# Patient Record
Sex: FEMALE | Race: BLACK OR AFRICAN AMERICAN | NOT HISPANIC OR LATINO | Employment: OTHER | URBAN - METROPOLITAN AREA
[De-identification: names, ages, dates, MRNs, and addresses within clinical notes are randomized per-mention and may not be internally consistent; named-entity substitution may affect disease eponyms.]

---

## 2017-07-04 ENCOUNTER — HOSPITAL ENCOUNTER (OUTPATIENT)
Dept: RADIOLOGY | Facility: HOSPITAL | Age: 42
Discharge: HOME | End: 2017-07-04
Attending: PEDIATRICS
Payer: MEDICAID

## 2017-07-04 DIAGNOSIS — M25.561 RIGHT KNEE PAIN: ICD-10-CM

## 2017-07-04 DIAGNOSIS — M25.561 RIGHT KNEE PAIN: Primary | ICD-10-CM

## 2017-07-04 PROCEDURE — 73560 X-RAY EXAM OF KNEE 1 OR 2: CPT | Mod: 26,RT,, | Performed by: RADIOLOGY

## 2017-07-04 PROCEDURE — 73560 X-RAY EXAM OF KNEE 1 OR 2: CPT | Mod: TC,RT

## 2017-08-14 PROBLEM — M17.0 BILATERAL PRIMARY OSTEOARTHRITIS OF KNEE: Chronic | Status: ACTIVE | Noted: 2017-08-14

## 2017-09-25 ENCOUNTER — HOSPITAL ENCOUNTER (EMERGENCY)
Facility: HOSPITAL | Age: 42
Discharge: HOME | End: 2017-09-25
Attending: SURGERY
Payer: MEDICAID

## 2017-09-25 VITALS
DIASTOLIC BLOOD PRESSURE: 79 MMHG | BODY MASS INDEX: 56.82 KG/M2 | TEMPERATURE: 96 F | HEART RATE: 86 BPM | RESPIRATION RATE: 20 BRPM | WEIGHT: 293 LBS | SYSTOLIC BLOOD PRESSURE: 140 MMHG

## 2017-09-25 DIAGNOSIS — J03.90 TONSILLITIS: Primary | ICD-10-CM

## 2017-09-25 LAB — DEPRECATED S PYO AG THROAT QL EIA: NEGATIVE

## 2017-09-25 PROCEDURE — 99283 EMERGENCY DEPT VISIT LOW MDM: CPT | Mod: 25

## 2017-09-25 PROCEDURE — 87147 CULTURE TYPE IMMUNOLOGIC: CPT

## 2017-09-25 PROCEDURE — 63600175 PHARM REV CODE 636 W HCPCS: Performed by: SURGERY

## 2017-09-25 PROCEDURE — 96372 THER/PROPH/DIAG INJ SC/IM: CPT

## 2017-09-25 PROCEDURE — 87880 STREP A ASSAY W/OPTIC: CPT

## 2017-09-25 PROCEDURE — 25000003 PHARM REV CODE 250: Performed by: SURGERY

## 2017-09-25 PROCEDURE — 87081 CULTURE SCREEN ONLY: CPT

## 2017-09-25 RX ORDER — ACETAMINOPHEN 500 MG
1000 TABLET ORAL
Status: COMPLETED | OUTPATIENT
Start: 2017-09-25 | End: 2017-09-25

## 2017-09-25 RX ORDER — AMOXICILLIN AND CLAVULANATE POTASSIUM 875; 125 MG/1; MG/1
1 TABLET, FILM COATED ORAL 2 TIMES DAILY
Qty: 20 TABLET | Refills: 0 | Status: SHIPPED | OUTPATIENT
Start: 2017-09-25 | End: 2017-10-05

## 2017-09-25 RX ORDER — CEFTRIAXONE 1 G/1
1 INJECTION, POWDER, FOR SOLUTION INTRAMUSCULAR; INTRAVENOUS
Status: COMPLETED | OUTPATIENT
Start: 2017-09-25 | End: 2017-09-25

## 2017-09-25 RX ORDER — METHYLPREDNISOLONE SOD SUCC 125 MG
125 VIAL (EA) INJECTION
Status: COMPLETED | OUTPATIENT
Start: 2017-09-25 | End: 2017-09-25

## 2017-09-25 RX ORDER — IBUPROFEN 800 MG/1
800 TABLET ORAL
Status: COMPLETED | OUTPATIENT
Start: 2017-09-25 | End: 2017-09-25

## 2017-09-25 RX ADMIN — METHYLPREDNISOLONE SODIUM SUCCINATE 125 MG: 125 INJECTION, POWDER, FOR SOLUTION INTRAMUSCULAR; INTRAVENOUS at 09:09

## 2017-09-25 RX ADMIN — ACETAMINOPHEN 1000 MG: 500 TABLET ORAL at 09:09

## 2017-09-25 RX ADMIN — CEFTRIAXONE SODIUM 1 G: 1 INJECTION, POWDER, FOR SOLUTION INTRAMUSCULAR; INTRAVENOUS at 09:09

## 2017-09-25 RX ADMIN — IBUPROFEN 800 MG: 800 TABLET ORAL at 09:09

## 2017-09-25 NOTE — ED PROVIDER NOTES
[facility]                                     September 25, 2017                   Chief Complaint  42 y.o. female with Fever and Sore Throat    History of Present Illness  Marci Hollingsworth presents to the emergency room with a sore throat this weekend  Patient states she's had a low-grade temperature and a sore throat, pain with swallowing  Patient on exam has oropharyngitis and tonsillitis with exudate, no peritonsillar abscess  Patient has normal swallowing and normal phonation, no stridor or drool on evaluation now  Pt has no evidence of Ángel's angina, temperatures 96.1°F on ER triage this morning  Patient has no nasal congestion or cough, no earache; only complaint is sore throat today    The history is provided by the patient  Medical history: DM, HTN, thyroid disease  History reviewed. No pertinent surgical history.   ALLERGIES: Morphine    Review of Systems and Physical Exam     Review of Systems  -- Constitution - fever, denies fatigue, no weakness, no chills  -- Eyes - no tearing or redness, no visual disturbance  -- Ear, Nose - no tinnitus or earache, no nasal congestion or discharge  -- Mouth,Throat - sore throat, no toothache, normal voice, normal swallowing  -- Respiratory - denies cough and congestion, no shortness of breath, no ALEJO  -- Cardiovascular - denies chest pain, no palpitations, denies claudication  -- Gastrointestinal - denies abdominal pain, nausea, vomiting, or diarrhea  -- Genitourinary - no dysuria, no denies flank pain, no hematuria or frequency   -- Musculoskeletal - denies back pain, negative for myalgias and arthralgias   -- Neurological - no headache, denies weakness or seizure; no LOC  -- Skin - denies pallor, rash, or changes in skin. no hives or welts noted    Vital Signs  -- Her oral temperature is 96.1 °F (35.6 °C).   -- Her blood pressure is 140/79 and her pulse is 86.   -- Her respiration is 20.      Physical Exam  -- Nursing note and vitals  reviewed  -- Constitutional: Appears well-developed and well-nourished  -- Head: Atraumatic. Normocephalic. No obvious abnormality  -- Eyes: Pupils are equal and reactive to light. Normal conjunctiva and lids  -- Nose: Nose normal in appearance, nares grossly normal. No discharge  -- Throat: mild posterior oropharnyx erythema with exudate & tonsillitis    -- Ears: External ears and TM normal bilaterally. Normal hearing and no drainage  -- Neck: Normal range of motion. Neck supple. No masses, trachea midline  -- Cardiac: Normal rate, regular rhythm and normal heart sounds  -- Pulmonary: Normal respiratory effort, breath sounds clear to auscultation  -- Abdominal: Soft, no tenderness. Normal bowel sounds. Normal liver edge  -- Musculoskeletal: Normal range of motion, no effusions. Joints stable   -- Neurological: No focal deficits. Showed good interaction with staff  -- Vascular: Posterior tibial, dorsalis pedis and radial pulses 2+ bilaterally      Emergency Room Course     Treatment Given  -- The strep screen was negative   -- cefTRIAXone injection 1 g (not administered)   -- methylPREDNISolone sodium succinate injection 125 mg (not administered)   -- acetaminophen tablet 1,000 mg (1,000 mg Oral Given 9/25/17 0939)   -- ibuprofen tablet 800 mg (800 mg Oral Given 9/25/17 0939)     Diagnosis  -- Tonsillitis    Disposition and Plan  -- Disposition: home  -- Condition: stable  -- Follow-up: Patient to follow up with Liliana Yang MD in 1-2 days.  -- I advised the patient that we have found no life threatening condition today  -- At this time, I believe the patient is clinically stable for discharge.   -- The patient acknowledges that close follow up with a MD is required   -- Patient agrees to comply with all instruction and direction given in the ER    This note is dictated on Dragon Natural Speaking word recognition program.  There are word recognition mistakes that are occasionally missed on review.              Aaron Barrientos MD  09/25/17 0910

## 2017-09-25 NOTE — ED NOTES
No s/s adverse reaction to medications given.  Discharge instructions/escript instructions given to patient, she voiced understanding.  Discharged to home in stable condition/ambulatory w steady gait.

## 2017-09-25 NOTE — ED TRIAGE NOTES
"Patient states, "I've been running fever and having worse than a sore throat for four or five days."  "

## 2017-09-25 NOTE — ED NOTES
"Patient discharge has been delayed due to patient requesting "capsules" for medications prescribed at discharge, Dr. Barrientos notified.  "

## 2017-09-27 LAB
BACTERIA THROAT CULT: NORMAL
BACTERIA THROAT CULT: NORMAL

## 2018-05-27 ENCOUNTER — HOSPITAL ENCOUNTER (EMERGENCY)
Facility: HOSPITAL | Age: 43
Discharge: HOME | End: 2018-05-27
Attending: SURGERY
Payer: MEDICAID

## 2018-05-27 VITALS
WEIGHT: 293 LBS | SYSTOLIC BLOOD PRESSURE: 131 MMHG | HEART RATE: 85 BPM | DIASTOLIC BLOOD PRESSURE: 75 MMHG | TEMPERATURE: 97 F | RESPIRATION RATE: 16 BRPM | BODY MASS INDEX: 55.08 KG/M2

## 2018-05-27 DIAGNOSIS — R51.9 NONINTRACTABLE HEADACHE, UNSPECIFIED CHRONICITY PATTERN, UNSPECIFIED HEADACHE TYPE: Primary | ICD-10-CM

## 2018-05-27 LAB
ALBUMIN SERPL BCP-MCNC: 3.3 G/DL
ALP SERPL-CCNC: 68 U/L
ALT SERPL W/O P-5'-P-CCNC: 10 U/L
ANION GAP SERPL CALC-SCNC: 10 MMOL/L
AST SERPL-CCNC: 9 U/L
BASOPHILS # BLD AUTO: 0.03 K/UL
BASOPHILS NFR BLD: 0.3 %
BILIRUB SERPL-MCNC: 0.2 MG/DL
BUN SERPL-MCNC: 15 MG/DL
CALCIUM SERPL-MCNC: 9.2 MG/DL
CHLORIDE SERPL-SCNC: 107 MMOL/L
CO2 SERPL-SCNC: 22 MMOL/L
CREAT SERPL-MCNC: 0.8 MG/DL
DIFFERENTIAL METHOD: ABNORMAL
EOSINOPHIL # BLD AUTO: 0.3 K/UL
EOSINOPHIL NFR BLD: 2.2 %
ERYTHROCYTE [DISTWIDTH] IN BLOOD BY AUTOMATED COUNT: 13.7 %
ERYTHROCYTE [SEDIMENTATION RATE] IN BLOOD BY WESTERGREN METHOD: 40 MM/HR
EST. GFR  (AFRICAN AMERICAN): >60 ML/MIN/1.73 M^2
EST. GFR  (NON AFRICAN AMERICAN): >60 ML/MIN/1.73 M^2
GLUCOSE SERPL-MCNC: 155 MG/DL
HCT VFR BLD AUTO: 37.3 %
HGB BLD-MCNC: 12.2 G/DL
LYMPHOCYTES # BLD AUTO: 4.3 K/UL
LYMPHOCYTES NFR BLD: 38.4 %
MCH RBC QN AUTO: 30.1 PG
MCHC RBC AUTO-ENTMCNC: 32.7 G/DL
MCV RBC AUTO: 92 FL
MONOCYTES # BLD AUTO: 0.9 K/UL
MONOCYTES NFR BLD: 7.9 %
NEUTROPHILS # BLD AUTO: 5.8 K/UL
NEUTROPHILS NFR BLD: 51.2 %
PLATELET # BLD AUTO: 399 K/UL
PMV BLD AUTO: 9.1 FL
POTASSIUM SERPL-SCNC: 3.7 MMOL/L
PROT SERPL-MCNC: 7.7 G/DL
RBC # BLD AUTO: 4.05 M/UL
SODIUM SERPL-SCNC: 139 MMOL/L
WBC # BLD AUTO: 11.31 K/UL

## 2018-05-27 PROCEDURE — 99284 EMERGENCY DEPT VISIT MOD MDM: CPT | Mod: 25

## 2018-05-27 PROCEDURE — 80053 COMPREHEN METABOLIC PANEL: CPT

## 2018-05-27 PROCEDURE — 63600175 PHARM REV CODE 636 W HCPCS: Performed by: SURGERY

## 2018-05-27 PROCEDURE — 85025 COMPLETE CBC W/AUTO DIFF WBC: CPT

## 2018-05-27 PROCEDURE — 96372 THER/PROPH/DIAG INJ SC/IM: CPT

## 2018-05-27 PROCEDURE — 36415 COLL VENOUS BLD VENIPUNCTURE: CPT

## 2018-05-27 PROCEDURE — 85651 RBC SED RATE NONAUTOMATED: CPT

## 2018-05-27 RX ORDER — KETOROLAC TROMETHAMINE 10 MG/1
10 TABLET, FILM COATED ORAL EVERY 6 HOURS PRN
Qty: 15 TABLET | Refills: 0 | Status: SHIPPED | OUTPATIENT
Start: 2018-05-27 | End: 2022-02-27

## 2018-05-27 RX ORDER — KETOROLAC TROMETHAMINE 30 MG/ML
60 INJECTION, SOLUTION INTRAMUSCULAR; INTRAVENOUS
Status: COMPLETED | OUTPATIENT
Start: 2018-05-27 | End: 2018-05-27

## 2018-05-27 RX ADMIN — KETOROLAC TROMETHAMINE 60 MG: 30 INJECTION, SOLUTION INTRAMUSCULAR at 04:05

## 2018-05-27 NOTE — ED PROVIDER NOTES
[facility]                                                 Chief Complaint  42 y.o. female with Headache    History of Present Illness  Marci Hollingsworth presents to the emergency room with headache issues today  Patient is alert and appropriate, states she has a right-sided headache this weekend  Patient has had a history of headaches, denies any head trauma or injury on ER ROS  Patient has no nausea vomiting, no photophobia, answers all questions appropriately   The patient has no fever, supple neck, no infection risk on ER interview this evening  States the headache is not positional, following a tension pattern, patient has stress    The history is provided by the patient   device was not used during this ER visit  Medical history: DM, HTN, thyroid disease  History reviewed. No pertinent surgical history.   ALLERGIES: Morphine    Review of Systems and Physical Exam      Review of Systems - provided by the patient  -- Constitution - no fever, denies fatigue, no weakness, no chills  -- Eyes - no tearing or redness, no visual disturbance  -- Ear, Nose - no tinnitus or earache, no nasal congestion or discharge  -- Mouth,Throat - no sore throat, no toothache, normal voice, normal swallowing  -- Respiratory - denies cough and congestion, no shortness of breath, no ALEJO  -- Cardiovascular - denies chest pain, no palpitations, denies claudication  -- Gastrointestinal - denies abdominal pain, nausea, vomiting, or diarrhea  -- Genitourinary - no dysuria, no denies flank pain, no hematuria or frequency   -- Musculoskeletal - denies back pain, negative for myalgias and arthralgias   -- Neurological - headache, denies weakness or seizure; no LOC  -- Skin - denies pallor, rash, or changes in skin. no hives or welts noted    BP (!) 149/89   Pulse 92   Temp 96.6 °F (35.9 °C) (Tympanic)   Resp 18    Physical Exam  -- Nursing note and vitals reviewed  -- Constitutional: Appears  well-developed and well-nourished  -- Head: Atraumatic. Normocephalic. No obvious abnormality  -- Eyes: Pupils are equal and reactive to light. Normal conjunctiva and lids  -- Nose: Nose normal in appearance, nares grossly normal. No discharge  -- Throat: Mucous membranes moist, pharynx normal, normal tonsils. No lesions   -- Ears: External ears and TM normal bilaterally. Normal hearing and no drainage  -- Neck: Normal range of motion. Neck supple. No masses, trachea midline  -- Cardiac: Normal rate, regular rhythm and normal heart sounds  -- Pulmonary: Normal respiratory effort, breath sounds clear to auscultation  -- Abdominal: Soft, no tenderness. Normal bowel sounds. Normal liver edge  -- Musculoskeletal: Normal range of motion, no effusions. Joints stable   -- Neurological: No focal deficits. Showed good interaction with staff  -- Vascular: Posterior tibial, dorsalis pedis and radial pulses 2+ bilaterally      Emergency Room Course      Labs     K 3.7      CO2 22 (L)   BUN 15   CREATININE 0.8    (H)   ALKPHOS 68   AST 9 (L)   ALT 10   BILITOT 0.2   ALBUMIN 3.3 (L)   PROT 7.7   WBC 11.31   HGB 12.2   HCT 37.3    (H)     Radiology  -- The CT of the head performed in the ER today was negative for acute pathology    Additional Work up  -- ESR was 40    Medications Given  -- IM 60 mg Toradol given today in the ER     Diagnosis  -- Nonintractable headache    Disposition and Plan  -- Disposition: home  -- Condition: stable  -- Follow-up: Patient to follow up with Liliana Yang MD in 1-2 days.  -- I advised the patient that we have found no life threatening condition today  -- At this time, I believe the patient is clinically stable for discharge.   -- The patient acknowledges that close follow up with a MD is required   -- Patient agrees to comply with all instruction and direction given in the ER    This note is dictated on Dragon Natural Speaking word recognition program.  There are word  recognition mistakes that are occasionally missed on review.            Aaron Barrientos MD  05/27/18 0162

## 2021-07-30 ENCOUNTER — HOSPITAL ENCOUNTER (OUTPATIENT)
Dept: RADIOLOGY | Facility: HOSPITAL | Age: 46
Discharge: HOME | End: 2021-07-30
Attending: NURSE PRACTITIONER
Payer: MEDICAID

## 2021-07-30 VITALS — HEIGHT: 66 IN | WEIGHT: 293 LBS | BODY MASS INDEX: 47.09 KG/M2

## 2021-07-30 DIAGNOSIS — Z12.31 BREAST CANCER SCREENING BY MAMMOGRAM: ICD-10-CM

## 2021-07-30 PROCEDURE — 77067 SCR MAMMO BI INCL CAD: CPT | Mod: TC

## 2021-07-30 PROCEDURE — 77067 MAMMO DIGITAL SCREENING BILAT WITH TOMO: ICD-10-PCS | Mod: 26,,, | Performed by: RADIOLOGY

## 2021-07-30 PROCEDURE — 77063 BREAST TOMOSYNTHESIS BI: CPT | Mod: 26,,, | Performed by: RADIOLOGY

## 2021-07-30 PROCEDURE — 77063 MAMMO DIGITAL SCREENING BILAT WITH TOMO: ICD-10-PCS | Mod: 26,,, | Performed by: RADIOLOGY

## 2021-07-30 PROCEDURE — 77067 SCR MAMMO BI INCL CAD: CPT | Mod: 26,,, | Performed by: RADIOLOGY

## 2021-12-05 ENCOUNTER — HOSPITAL ENCOUNTER (EMERGENCY)
Facility: HOSPITAL | Age: 46
Discharge: HOME | End: 2021-12-05
Attending: SURGERY
Payer: MEDICAID

## 2021-12-05 VITALS
BODY MASS INDEX: 53.75 KG/M2 | SYSTOLIC BLOOD PRESSURE: 159 MMHG | DIASTOLIC BLOOD PRESSURE: 86 MMHG | OXYGEN SATURATION: 98 % | TEMPERATURE: 98 F | RESPIRATION RATE: 20 BRPM | HEART RATE: 100 BPM | WEIGHT: 293 LBS

## 2021-12-05 DIAGNOSIS — M75.90 BURSITIS AND TENDINITIS OF SHOULDER REGION: Primary | ICD-10-CM

## 2021-12-05 DIAGNOSIS — M75.50 BURSITIS AND TENDINITIS OF SHOULDER REGION: Primary | ICD-10-CM

## 2021-12-05 DIAGNOSIS — R52 PAIN: ICD-10-CM

## 2021-12-05 DIAGNOSIS — M25.519 SHOULDER PAIN: ICD-10-CM

## 2021-12-05 PROCEDURE — 93010 ELECTROCARDIOGRAM REPORT: CPT | Mod: ,,, | Performed by: INTERNAL MEDICINE

## 2021-12-05 PROCEDURE — 93010 EKG 12-LEAD: ICD-10-PCS | Mod: ,,, | Performed by: INTERNAL MEDICINE

## 2021-12-05 PROCEDURE — 99284 EMERGENCY DEPT VISIT MOD MDM: CPT | Mod: 25

## 2021-12-05 PROCEDURE — 93005 ELECTROCARDIOGRAM TRACING: CPT

## 2021-12-05 RX ORDER — SULINDAC 150 MG/1
150 TABLET ORAL 2 TIMES DAILY
Qty: 20 TABLET | Refills: 0 | Status: SHIPPED | OUTPATIENT
Start: 2021-12-05 | End: 2021-12-15

## 2021-12-05 RX ORDER — TRAMADOL HYDROCHLORIDE 50 MG/1
50 TABLET ORAL EVERY 8 HOURS PRN
Qty: 12 TABLET | Refills: 0 | Status: SHIPPED | OUTPATIENT
Start: 2021-12-05 | End: 2021-12-09

## 2022-02-27 ENCOUNTER — HOSPITAL ENCOUNTER (EMERGENCY)
Facility: HOSPITAL | Age: 47
Discharge: HOME | End: 2022-02-27
Attending: STUDENT IN AN ORGANIZED HEALTH CARE EDUCATION/TRAINING PROGRAM
Payer: MEDICAID

## 2022-02-27 VITALS
OXYGEN SATURATION: 98 % | HEART RATE: 93 BPM | DIASTOLIC BLOOD PRESSURE: 79 MMHG | BODY MASS INDEX: 53.73 KG/M2 | SYSTOLIC BLOOD PRESSURE: 159 MMHG | WEIGHT: 293 LBS | RESPIRATION RATE: 20 BRPM | TEMPERATURE: 97 F

## 2022-02-27 DIAGNOSIS — G89.29 CHRONIC LEFT SHOULDER PAIN: Primary | ICD-10-CM

## 2022-02-27 DIAGNOSIS — M25.511 CHRONIC RIGHT SHOULDER PAIN: ICD-10-CM

## 2022-02-27 DIAGNOSIS — M25.512 CHRONIC LEFT SHOULDER PAIN: Primary | ICD-10-CM

## 2022-02-27 DIAGNOSIS — G89.29 CHRONIC RIGHT SHOULDER PAIN: ICD-10-CM

## 2022-02-27 PROCEDURE — 99284 EMERGENCY DEPT VISIT MOD MDM: CPT | Mod: 25

## 2022-02-27 PROCEDURE — 63600175 PHARM REV CODE 636 W HCPCS: Performed by: STUDENT IN AN ORGANIZED HEALTH CARE EDUCATION/TRAINING PROGRAM

## 2022-02-27 PROCEDURE — 96372 THER/PROPH/DIAG INJ SC/IM: CPT

## 2022-02-27 RX ORDER — KETOROLAC TROMETHAMINE 30 MG/ML
15 INJECTION, SOLUTION INTRAMUSCULAR; INTRAVENOUS
Status: COMPLETED | OUTPATIENT
Start: 2022-02-27 | End: 2022-02-27

## 2022-02-27 RX ORDER — CYCLOBENZAPRINE HCL 10 MG
10 TABLET ORAL 3 TIMES DAILY PRN
Qty: 15 TABLET | Refills: 0 | Status: SHIPPED | OUTPATIENT
Start: 2022-02-27 | End: 2022-03-04

## 2022-02-27 RX ORDER — IBUPROFEN 800 MG/1
800 TABLET ORAL EVERY 6 HOURS PRN
Qty: 20 TABLET | Refills: 0 | Status: SHIPPED | OUTPATIENT
Start: 2022-02-27 | End: 2022-04-06

## 2022-02-27 RX ADMIN — KETOROLAC TROMETHAMINE 15 MG: 30 INJECTION, SOLUTION INTRAMUSCULAR at 07:02
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
## 2022-04-06 ENCOUNTER — OFFICE VISIT (OUTPATIENT)
Dept: ORTHOPEDICS | Facility: CLINIC | Age: 47
End: 2022-04-06
Payer: MEDICAID

## 2022-04-06 VITALS
HEART RATE: 89 BPM | BODY MASS INDEX: 47.09 KG/M2 | SYSTOLIC BLOOD PRESSURE: 132 MMHG | DIASTOLIC BLOOD PRESSURE: 84 MMHG | WEIGHT: 293 LBS | HEIGHT: 66 IN | RESPIRATION RATE: 18 BRPM

## 2022-04-06 DIAGNOSIS — M25.512 CHRONIC LEFT SHOULDER PAIN: Primary | ICD-10-CM

## 2022-04-06 DIAGNOSIS — M25.561 PAIN IN BOTH KNEES, UNSPECIFIED CHRONICITY: Primary | ICD-10-CM

## 2022-04-06 DIAGNOSIS — G89.29 CHRONIC LEFT SHOULDER PAIN: Primary | ICD-10-CM

## 2022-04-06 DIAGNOSIS — M25.562 PAIN IN BOTH KNEES, UNSPECIFIED CHRONICITY: Primary | ICD-10-CM

## 2022-04-06 PROCEDURE — 99203 PR OFFICE/OUTPT VISIT, NEW, LEVL III, 30-44 MIN: ICD-10-PCS | Mod: S$PBB,25,, | Performed by: PHYSICIAN ASSISTANT

## 2022-04-06 PROCEDURE — 1160F RVW MEDS BY RX/DR IN RCRD: CPT | Mod: CPTII,,, | Performed by: PHYSICIAN ASSISTANT

## 2022-04-06 PROCEDURE — 3008F PR BODY MASS INDEX (BMI) DOCUMENTED: ICD-10-PCS | Mod: CPTII,,, | Performed by: PHYSICIAN ASSISTANT

## 2022-04-06 PROCEDURE — 20610 DRAIN/INJ JOINT/BURSA W/O US: CPT | Mod: PBBFAC,LT | Performed by: PHYSICIAN ASSISTANT

## 2022-04-06 PROCEDURE — 1159F MED LIST DOCD IN RCRD: CPT | Mod: CPTII,,, | Performed by: PHYSICIAN ASSISTANT

## 2022-04-06 PROCEDURE — 99999 PR PBB SHADOW E&M-EST. PATIENT-LVL IV: CPT | Mod: PBBFAC,,, | Performed by: PHYSICIAN ASSISTANT

## 2022-04-06 PROCEDURE — 4010F ACE/ARB THERAPY RXD/TAKEN: CPT | Mod: CPTII,,, | Performed by: PHYSICIAN ASSISTANT

## 2022-04-06 PROCEDURE — 3008F BODY MASS INDEX DOCD: CPT | Mod: CPTII,,, | Performed by: PHYSICIAN ASSISTANT

## 2022-04-06 PROCEDURE — 20610 DRAIN/INJ JOINT/BURSA W/O US: CPT | Mod: S$PBB,LT,, | Performed by: PHYSICIAN ASSISTANT

## 2022-04-06 PROCEDURE — 1159F PR MEDICATION LIST DOCUMENTED IN MEDICAL RECORD: ICD-10-PCS | Mod: CPTII,,, | Performed by: PHYSICIAN ASSISTANT

## 2022-04-06 PROCEDURE — 99203 OFFICE O/P NEW LOW 30 MIN: CPT | Mod: S$PBB,25,, | Performed by: PHYSICIAN ASSISTANT

## 2022-04-06 PROCEDURE — 1160F PR REVIEW ALL MEDS BY PRESCRIBER/CLIN PHARMACIST DOCUMENTED: ICD-10-PCS | Mod: CPTII,,, | Performed by: PHYSICIAN ASSISTANT

## 2022-04-06 PROCEDURE — 99999 PR PBB SHADOW E&M-EST. PATIENT-LVL IV: ICD-10-PCS | Mod: PBBFAC,,, | Performed by: PHYSICIAN ASSISTANT

## 2022-04-06 PROCEDURE — 4010F PR ACE/ARB THEARPY RXD/TAKEN: ICD-10-PCS | Mod: CPTII,,, | Performed by: PHYSICIAN ASSISTANT

## 2022-04-06 PROCEDURE — 3075F PR MOST RECENT SYSTOLIC BLOOD PRESS GE 130-139MM HG: ICD-10-PCS | Mod: CPTII,,, | Performed by: PHYSICIAN ASSISTANT

## 2022-04-06 PROCEDURE — 3075F SYST BP GE 130 - 139MM HG: CPT | Mod: CPTII,,, | Performed by: PHYSICIAN ASSISTANT

## 2022-04-06 PROCEDURE — 3079F PR MOST RECENT DIASTOLIC BLOOD PRESSURE 80-89 MM HG: ICD-10-PCS | Mod: CPTII,,, | Performed by: PHYSICIAN ASSISTANT

## 2022-04-06 PROCEDURE — 20610 PR DRAIN/INJECT LARGE JOINT/BURSA: ICD-10-PCS | Mod: S$PBB,LT,, | Performed by: PHYSICIAN ASSISTANT

## 2022-04-06 PROCEDURE — 99214 OFFICE O/P EST MOD 30 MIN: CPT | Mod: PBBFAC | Performed by: PHYSICIAN ASSISTANT

## 2022-04-06 PROCEDURE — 3079F DIAST BP 80-89 MM HG: CPT | Mod: CPTII,,, | Performed by: PHYSICIAN ASSISTANT

## 2022-04-06 RX ORDER — ROSUVASTATIN CALCIUM 5 MG/1
5 TABLET, COATED ORAL NIGHTLY
COMMUNITY
Start: 2022-03-22

## 2022-04-06 RX ORDER — LIOTHYRONINE SODIUM 25 UG/1
25 TABLET ORAL DAILY
COMMUNITY
Start: 2022-03-22 | End: 2022-05-05 | Stop reason: DRUGHIGH

## 2022-04-06 RX ORDER — MEDROXYPROGESTERONE ACETATE 150 MG/ML
INJECTION, SUSPENSION INTRAMUSCULAR
COMMUNITY

## 2022-04-06 RX ORDER — MELOXICAM 15 MG/1
15 TABLET ORAL DAILY
Qty: 30 TABLET | Refills: 0 | Status: SHIPPED | OUTPATIENT
Start: 2022-04-06 | End: 2022-04-27 | Stop reason: ALTCHOICE

## 2022-04-06 RX ORDER — DULAGLUTIDE 0.75 MG/.5ML
INJECTION, SOLUTION SUBCUTANEOUS
COMMUNITY
Start: 2022-03-30

## 2022-04-06 RX ORDER — TRIAMCINOLONE ACETONIDE 40 MG/ML
40 INJECTION, SUSPENSION INTRA-ARTICULAR; INTRAMUSCULAR ONCE
Status: COMPLETED | OUTPATIENT
Start: 2022-04-06 | End: 2022-04-06

## 2022-04-06 RX ORDER — NAPROXEN 500 MG/1
500 TABLET ORAL EVERY 12 HOURS PRN
COMMUNITY
Start: 2022-03-11 | End: 2022-04-19 | Stop reason: ALTCHOICE

## 2022-04-06 RX ADMIN — TRIAMCINOLONE ACETONIDE 40 MG: 40 INJECTION, SUSPENSION INTRA-ARTICULAR; INTRAMUSCULAR at 09:04
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
## 2022-04-08 ENCOUNTER — OFFICE VISIT (OUTPATIENT)
Dept: ORTHOPEDICS | Facility: CLINIC | Age: 47
End: 2022-04-08
Payer: MEDICAID

## 2022-04-08 ENCOUNTER — HOSPITAL ENCOUNTER (OUTPATIENT)
Dept: RADIOLOGY | Facility: HOSPITAL | Age: 47
Discharge: HOME | End: 2022-04-08
Attending: PHYSICIAN ASSISTANT
Payer: MEDICAID

## 2022-04-08 VITALS
BODY MASS INDEX: 47.09 KG/M2 | HEART RATE: 92 BPM | DIASTOLIC BLOOD PRESSURE: 88 MMHG | SYSTOLIC BLOOD PRESSURE: 124 MMHG | HEIGHT: 66 IN | RESPIRATION RATE: 18 BRPM | WEIGHT: 293 LBS

## 2022-04-08 DIAGNOSIS — M25.562 CHRONIC PAIN OF BOTH KNEES: ICD-10-CM

## 2022-04-08 DIAGNOSIS — M25.561 CHRONIC PAIN OF BOTH KNEES: ICD-10-CM

## 2022-04-08 DIAGNOSIS — M25.562 PAIN IN BOTH KNEES, UNSPECIFIED CHRONICITY: ICD-10-CM

## 2022-04-08 DIAGNOSIS — M25.561 PAIN IN BOTH KNEES, UNSPECIFIED CHRONICITY: ICD-10-CM

## 2022-04-08 DIAGNOSIS — G89.29 CHRONIC PAIN OF BOTH KNEES: ICD-10-CM

## 2022-04-08 DIAGNOSIS — M17.0 BILATERAL PRIMARY OSTEOARTHRITIS OF KNEE: Primary | ICD-10-CM

## 2022-04-08 PROCEDURE — 3008F PR BODY MASS INDEX (BMI) DOCUMENTED: ICD-10-PCS | Mod: CPTII,,, | Performed by: PHYSICIAN ASSISTANT

## 2022-04-08 PROCEDURE — 3079F PR MOST RECENT DIASTOLIC BLOOD PRESSURE 80-89 MM HG: ICD-10-PCS | Mod: CPTII,,, | Performed by: PHYSICIAN ASSISTANT

## 2022-04-08 PROCEDURE — 99213 PR OFFICE/OUTPT VISIT, EST, LEVL III, 20-29 MIN: ICD-10-PCS | Mod: S$PBB,,, | Performed by: PHYSICIAN ASSISTANT

## 2022-04-08 PROCEDURE — 73564 X-RAY EXAM KNEE 4 OR MORE: CPT | Mod: TC,50

## 2022-04-08 PROCEDURE — 1159F MED LIST DOCD IN RCRD: CPT | Mod: CPTII,,, | Performed by: PHYSICIAN ASSISTANT

## 2022-04-08 PROCEDURE — 73564 XR KNEE ORTHO BILAT WITH FLEXION: ICD-10-PCS | Mod: 26,50,, | Performed by: RADIOLOGY

## 2022-04-08 PROCEDURE — 1160F RVW MEDS BY RX/DR IN RCRD: CPT | Mod: CPTII,,, | Performed by: PHYSICIAN ASSISTANT

## 2022-04-08 PROCEDURE — 3008F BODY MASS INDEX DOCD: CPT | Mod: CPTII,,, | Performed by: PHYSICIAN ASSISTANT

## 2022-04-08 PROCEDURE — 1159F PR MEDICATION LIST DOCUMENTED IN MEDICAL RECORD: ICD-10-PCS | Mod: CPTII,,, | Performed by: PHYSICIAN ASSISTANT

## 2022-04-08 PROCEDURE — 3079F DIAST BP 80-89 MM HG: CPT | Mod: CPTII,,, | Performed by: PHYSICIAN ASSISTANT

## 2022-04-08 PROCEDURE — 99213 OFFICE O/P EST LOW 20 MIN: CPT | Mod: S$PBB,,, | Performed by: PHYSICIAN ASSISTANT

## 2022-04-08 PROCEDURE — 99999 PR PBB SHADOW E&M-EST. PATIENT-LVL IV: CPT | Mod: PBBFAC,,, | Performed by: PHYSICIAN ASSISTANT

## 2022-04-08 PROCEDURE — 1160F PR REVIEW ALL MEDS BY PRESCRIBER/CLIN PHARMACIST DOCUMENTED: ICD-10-PCS | Mod: CPTII,,, | Performed by: PHYSICIAN ASSISTANT

## 2022-04-08 PROCEDURE — 4010F PR ACE/ARB THEARPY RXD/TAKEN: ICD-10-PCS | Mod: CPTII,,, | Performed by: PHYSICIAN ASSISTANT

## 2022-04-08 PROCEDURE — 3074F PR MOST RECENT SYSTOLIC BLOOD PRESSURE < 130 MM HG: ICD-10-PCS | Mod: CPTII,,, | Performed by: PHYSICIAN ASSISTANT

## 2022-04-08 PROCEDURE — 73564 X-RAY EXAM KNEE 4 OR MORE: CPT | Mod: 26,50,, | Performed by: RADIOLOGY

## 2022-04-08 PROCEDURE — 99214 OFFICE O/P EST MOD 30 MIN: CPT | Mod: PBBFAC | Performed by: PHYSICIAN ASSISTANT

## 2022-04-08 PROCEDURE — 99999 PR PBB SHADOW E&M-EST. PATIENT-LVL IV: ICD-10-PCS | Mod: PBBFAC,,, | Performed by: PHYSICIAN ASSISTANT

## 2022-04-08 PROCEDURE — 3074F SYST BP LT 130 MM HG: CPT | Mod: CPTII,,, | Performed by: PHYSICIAN ASSISTANT

## 2022-04-08 PROCEDURE — 4010F ACE/ARB THERAPY RXD/TAKEN: CPT | Mod: CPTII,,, | Performed by: PHYSICIAN ASSISTANT

## 2022-04-08 NOTE — PROGRESS NOTES
Patient reported to clinic for office visit with BRUCE Stringer. Measured patient for Valgus  per orders.  6 in above knee: 28 in  Mid knee: 20 in  6 in below knee: 17 in    Orders emailed to Jesus Rodas for processing.

## 2022-04-08 NOTE — PROGRESS NOTES
Subjective:      Patient ID: Marci Hollingsworth is a 46 y.o. female.    Chief Complaint: Pain of the Left Knee and Pain of the Right Knee    Review of patient's allergies indicates:   Allergen Reactions    Morphine Hives    Shellfish containing products Swelling    Citrus flavor Hives    Sulindac Itching    Penicillin Rash        45 yo F presents to clinic with c/o bilateral knee pain x years.  Pain is achy and located mostly to medial knees.  Left worse than right.  Worse with bending knees and walking and improves with rest.  Also worse with prolonged sitting/standing.  She has previously seen Chad matthews and had CSI, last one in 2018.  She reports good relief from CSI initially but last one lasted about a week.  Occasional swelling, no redness or warmth.  No locking/catching/giving out.  No radiation of pain.  No numbness/tingling.          Review of Systems   Constitutional: Negative for chills, diaphoresis and fever.   HENT: Negative for congestion, ear discharge and ear pain.    Eyes: Negative for blurred vision, discharge, double vision and pain.   Cardiovascular: Negative for chest pain, claudication and cyanosis.   Respiratory: Negative for cough, hemoptysis and shortness of breath.    Endocrine: Negative for cold intolerance and heat intolerance.   Skin: Negative for color change, dry skin, itching and rash.   Musculoskeletal: Positive for arthritis, joint pain and joint swelling. Negative for back pain, falls, gout, muscle weakness and neck pain.   Gastrointestinal: Negative for abdominal pain and change in bowel habit.   Neurological: Negative for brief paralysis, disturbances in coordination, dizziness, numbness and paresthesias.   Psychiatric/Behavioral: Negative for altered mental status and depression.         Objective:          General    Constitutional: She is oriented to person, place, and time. She appears well-developed and well-nourished. No distress.   HENT:   Head: Atraumatic.    Eyes: EOM are normal. Right eye exhibits no discharge. Left eye exhibits no discharge.   Cardiovascular: Normal rate.    Pulmonary/Chest: Effort normal. No respiratory distress.   Abdominal: Soft.   Neurological: She is alert and oriented to person, place, and time.   Psychiatric: She has a normal mood and affect. Her behavior is normal.     General Musculoskeletal Exam   Gait: antalgic       Right Knee Exam     Inspection   Erythema: absent  Scars: absent  Swelling: absent  Effusion: absent  Deformity: absent  Bruising: absent    Tenderness   The patient is tender to palpation of the medial joint line.    Crepitus   The patient has crepitus of the patella and medial joint line.    Range of Motion   Extension: 0   Flexion: 130     Tests   Ligament Examination   MCL - Valgus: normal (0 to 2mm)  LCL - Varus: normal    Other   Popliteal (Baker's) Cyst: absent  Sensation: normal    Left Knee Exam     Inspection   Erythema: absent  Scars: absent  Swelling: absent  Effusion: absent  Deformity: absent  Bruising: absent    Tenderness   The patient tender to palpation of the medial joint line.    Crepitus   The patient has crepitus of the patella and medial joint line.    Range of Motion   Extension: 0   Flexion: 130     Tests   Stability   MCL - Valgus: normal (0 to 2mm)  LCL - Varus: normal (0 to 2mm)    Other   Popliteal (Baker's) Cyst: present  Sensation: normal    Muscle Strength   Right Lower Extremity   Hip Abduction: 5/5   Quadriceps:  4/5   Hamstrin/5   Left Lower Extremity   Hip Abduction: 5/5   Quadriceps:  4/5   Hamstrin/5     Vascular Exam     Right Pulses  Dorsalis Pedis:      2+          Left Pulses  Dorsalis Pedis:      2+          Edema  Right Lower Leg: absent  Left Lower Leg: absent              Assessment:         Xray Bilateral Knee 22:  Right: No fracture or dislocation.  Small joint effusion.  Severe medial compartment joint space narrowing with bone-on-bone.  There is subchondral  sclerosis with marginal osteophyte formation.  Significant dorsal spurring from the patella with severe osteoarthritic change of the patellofemoral compartment.     Left: No fracture or dislocation.  Small joint effusion.  Severe medial compartment joint space narrowing with bone on bone and subchondral sclerosis.  Marginal osteophytes are present.  Significant degenerative changes of the patellofemoral compartment with large osseous spurring dorsally.      Encounter Diagnoses   Name Primary?    Bilateral primary osteoarthritis of knee Yes    Chronic pain of both knees     Bilateral primary osteoarthritis of knee  -     sodium hyaluronate (EUFLEXXA) 10 mg/mL(mw 2.4 -3.6 million) injection 20 mg  -     sodium hyaluronate (EUFLEXXA) 10 mg/mL(mw 2.4 -3.6 million) injection 20 mg  -     Prior authorization Order  -     KNEE BRACE FOR HOME USE    Chronic pain of both knees               Plan:         The total face-to-face encounter time with this patient was 30 min and greater than 50% of of the encounter time was spent counseling the patient, coordinating care, and education regarding the pathology and natural history of her diagnosis. We have discussed a variety of treatment options including medications, injections, physical therapy and other alternative treatments. I also explained the indications, risks and benefits of surgery. We also discussed weight loss and ideal BMI for surgery. Pt would like to proceed with visco-supplementation at this time. She states that she is trying to lose weight and would like to consider joint replacement in the future. She would also like to try  brace for left knee.    I made the decision to obtain old records of the patient including previous notes and imaging. New imaging was ordered today of the extremity or extremities evaluated.     1. Pre-authorization placed for Euflexxa series injections.  2. Ice compress to the affected area 2-3x a day for 15-20 minutes as needed  for pain management.  3. Mobic 15 mg 1 time daily PRN for pain management. Patient understands to take with food and/or OTC prilosec to decrease GI side effects.  4. Valgus  brace for left knee. Pt measured today in clinic.  5. Knee conditioning HEP.  6. RTC in 2 weeks for Euflexxa series injections #1 of 3.    Patient voices understanding of and agreement with treatment plan. All of the patient's questions were answered and the patient will contact us if she has any questions or concerns in the interim.

## 2022-04-13 ENCOUNTER — HOSPITAL ENCOUNTER (OUTPATIENT)
Dept: RADIOLOGY | Facility: HOSPITAL | Age: 47
Discharge: HOME | End: 2022-04-13
Attending: NURSE PRACTITIONER
Payer: MEDICAID

## 2022-04-13 DIAGNOSIS — E03.9 HYPOTHYROIDISM, ADULT: ICD-10-CM

## 2022-04-13 PROCEDURE — 76536 US EXAM OF HEAD AND NECK: CPT | Mod: 26,,, | Performed by: RADIOLOGY

## 2022-04-13 PROCEDURE — 76536 US SOFT TISSUE HEAD NECK THYROID: ICD-10-PCS | Mod: 26,,, | Performed by: RADIOLOGY

## 2022-04-13 PROCEDURE — 76536 US EXAM OF HEAD AND NECK: CPT | Mod: TC

## 2022-04-18 ENCOUNTER — TELEPHONE (OUTPATIENT)
Dept: ORTHOPEDICS | Facility: CLINIC | Age: 47
End: 2022-04-18
Payer: MEDICAID

## 2022-04-18 DIAGNOSIS — G89.29 CHRONIC LEFT SHOULDER PAIN: Primary | ICD-10-CM

## 2022-04-18 DIAGNOSIS — M25.512 CHRONIC LEFT SHOULDER PAIN: Primary | ICD-10-CM

## 2022-04-18 DIAGNOSIS — M17.0 BILATERAL PRIMARY OSTEOARTHRITIS OF KNEE: ICD-10-CM

## 2022-04-18 NOTE — TELEPHONE ENCOUNTER
Spoke with patient. States her left shoulder pain did not get better after injection on 4/8/22. States she has been taking Tylenol 500 mg every 6 hours and using biofreeze. States she has not had any relief. States current pain is 9/10. Aware BRUCE Stringer are out of the office until tomorrow. Advised patient is pain is intolerable today to report to ER due to provider being out of the office. Voiced understanding.

## 2022-04-18 NOTE — TELEPHONE ENCOUNTER
----- Message from Valerie Bone MA sent at 2022 10:03 AM CDT -----  Contact: SELF  Marci Hollingsworth  MRN: [id number]  : 1975  PCP: Sofia Medina  Home Phone      [phone]  Work Phone      [phone]  Mobile          [phone]      MESSAGE:  Needs to speak to nurse regarding shoulder pain.      PHONE:  [phone]

## 2022-04-19 RX ORDER — KETOROLAC TROMETHAMINE 10 MG/1
10 TABLET, FILM COATED ORAL EVERY 6 HOURS PRN
Qty: 20 TABLET | Refills: 0 | Status: SHIPPED | OUTPATIENT
Start: 2022-04-19 | End: 2022-04-24

## 2022-04-19 NOTE — TELEPHONE ENCOUNTER
Spoke with patient. Notified. Voiced understanding. States she is doing therapy at home, due to unable to got to outpatient PT. States that Mobic that was prescribed is not helping. Requesting alternative. Please advise.

## 2022-04-19 NOTE — TELEPHONE ENCOUNTER
Toradol was sent to pharmacy. Stop Mobic and do not take any other NSAIDs. She should follow up in clinic if pain continues.

## 2022-04-22 ENCOUNTER — TELEPHONE (OUTPATIENT)
Dept: ORTHOPEDICS | Facility: CLINIC | Age: 47
End: 2022-04-22
Payer: MEDICAID

## 2022-04-22 NOTE — TELEPHONE ENCOUNTER
Spoke with patient. Advised that bilateral Euflexxa was approved by insurance. States she cannot come today. Rescheduled.

## 2022-04-22 NOTE — TELEPHONE ENCOUNTER
----- Message from Eva Silver sent at 2022  8:56 AM CDT -----  Regarding: Request to speak to a nurse  Contact: self  Marci Hollingsworth  MRN: [id number]  : 1975  PCP: Sofia Medina  Home Phone      [phone]  Work Phone      [phone]  Mobile          [phone]      MESSAGE:   Request to speak to a nurse regarding status of insurance approval for infection to both knees.   Patient c/o pain to both knees, the left knee hurts the most.   Appointment with BRUCE Stringer - 22    Phone # [phone]    Pharmacy -  [facility], [place] [id number] [place] [address] [address]

## 2022-04-27 ENCOUNTER — OFFICE VISIT (OUTPATIENT)
Dept: ORTHOPEDICS | Facility: CLINIC | Age: 47
End: 2022-04-27
Payer: MEDICAID

## 2022-04-27 VITALS
SYSTOLIC BLOOD PRESSURE: 104 MMHG | HEART RATE: 96 BPM | BODY MASS INDEX: 47.09 KG/M2 | DIASTOLIC BLOOD PRESSURE: 72 MMHG | HEIGHT: 66 IN | WEIGHT: 293 LBS | RESPIRATION RATE: 18 BRPM

## 2022-04-27 DIAGNOSIS — M17.12 PRIMARY OSTEOARTHRITIS OF LEFT KNEE: Primary | ICD-10-CM

## 2022-04-27 PROCEDURE — 99499 UNLISTED E&M SERVICE: CPT | Mod: S$PBB,,, | Performed by: PHYSICIAN ASSISTANT

## 2022-04-27 PROCEDURE — 20610 PR DRAIN/INJECT LARGE JOINT/BURSA: ICD-10-PCS | Mod: S$PBB,LT,, | Performed by: PHYSICIAN ASSISTANT

## 2022-04-27 PROCEDURE — 3074F SYST BP LT 130 MM HG: CPT | Mod: CPTII,,, | Performed by: PHYSICIAN ASSISTANT

## 2022-04-27 PROCEDURE — 4010F ACE/ARB THERAPY RXD/TAKEN: CPT | Mod: CPTII,,, | Performed by: PHYSICIAN ASSISTANT

## 2022-04-27 PROCEDURE — 99999 PR PBB SHADOW E&M-EST. PATIENT-LVL III: ICD-10-PCS | Mod: PBBFAC,,, | Performed by: PHYSICIAN ASSISTANT

## 2022-04-27 PROCEDURE — 3078F DIAST BP <80 MM HG: CPT | Mod: CPTII,,, | Performed by: PHYSICIAN ASSISTANT

## 2022-04-27 PROCEDURE — 1159F MED LIST DOCD IN RCRD: CPT | Mod: CPTII,,, | Performed by: PHYSICIAN ASSISTANT

## 2022-04-27 PROCEDURE — 20610 DRAIN/INJ JOINT/BURSA W/O US: CPT | Mod: S$PBB,LT,, | Performed by: PHYSICIAN ASSISTANT

## 2022-04-27 PROCEDURE — 3074F PR MOST RECENT SYSTOLIC BLOOD PRESSURE < 130 MM HG: ICD-10-PCS | Mod: CPTII,,, | Performed by: PHYSICIAN ASSISTANT

## 2022-04-27 PROCEDURE — 20610 DRAIN/INJ JOINT/BURSA W/O US: CPT | Mod: PBBFAC,LT | Performed by: PHYSICIAN ASSISTANT

## 2022-04-27 PROCEDURE — 99213 OFFICE O/P EST LOW 20 MIN: CPT | Mod: PBBFAC,25 | Performed by: PHYSICIAN ASSISTANT

## 2022-04-27 PROCEDURE — 3008F BODY MASS INDEX DOCD: CPT | Mod: CPTII,,, | Performed by: PHYSICIAN ASSISTANT

## 2022-04-27 PROCEDURE — 3008F PR BODY MASS INDEX (BMI) DOCUMENTED: ICD-10-PCS | Mod: CPTII,,, | Performed by: PHYSICIAN ASSISTANT

## 2022-04-27 PROCEDURE — 99499 NO LOS: ICD-10-PCS | Mod: S$PBB,,, | Performed by: PHYSICIAN ASSISTANT

## 2022-04-27 PROCEDURE — 1160F PR REVIEW ALL MEDS BY PRESCRIBER/CLIN PHARMACIST DOCUMENTED: ICD-10-PCS | Mod: CPTII,,, | Performed by: PHYSICIAN ASSISTANT

## 2022-04-27 PROCEDURE — 1160F RVW MEDS BY RX/DR IN RCRD: CPT | Mod: CPTII,,, | Performed by: PHYSICIAN ASSISTANT

## 2022-04-27 PROCEDURE — 1159F PR MEDICATION LIST DOCUMENTED IN MEDICAL RECORD: ICD-10-PCS | Mod: CPTII,,, | Performed by: PHYSICIAN ASSISTANT

## 2022-04-27 PROCEDURE — 99999 PR PBB SHADOW E&M-EST. PATIENT-LVL III: CPT | Mod: PBBFAC,,, | Performed by: PHYSICIAN ASSISTANT

## 2022-04-27 PROCEDURE — 3078F PR MOST RECENT DIASTOLIC BLOOD PRESSURE < 80 MM HG: ICD-10-PCS | Mod: CPTII,,, | Performed by: PHYSICIAN ASSISTANT

## 2022-04-27 PROCEDURE — 4010F PR ACE/ARB THEARPY RXD/TAKEN: ICD-10-PCS | Mod: CPTII,,, | Performed by: PHYSICIAN ASSISTANT

## 2022-04-27 RX ORDER — IBUPROFEN 800 MG/1
800 TABLET ORAL 3 TIMES DAILY
Qty: 60 TABLET | Refills: 0 | Status: SHIPPED | OUTPATIENT
Start: 2022-04-27

## 2022-04-27 RX ADMIN — Medication 20 MG: at 01:04

## 2022-04-27 NOTE — PROGRESS NOTES
Marci Hollingsworth is here for follow up of left knee arthritis. Pt is requesting Euflexxa series injections #1 of 3.  PMHx reviewed per encounter record. Has failed other conservative modalities including NSAIDS, activity modification, weight loss.  She was scheduled for bilateral Euflexxa #1 today but states that she wants to have left injection only.      PHYSICAL EXAMINATION:     General: The patient is alert and oriented x 3. Mood is pleasant.   Observation of ears, eyes and nose reveals no gross abnormalities. No   labored breathing observed.     No signs of infection or adverse reaction to knee.    Injection Procedure  A time out was performed, including verification of patient ID, procedure, site and side, availability of information and equipment, review of safety issues, and agreement with consent, the procedure site was marked.    After time out was performed, the patient was prepped aseptically with chloraprep. A diagnostic and therapeutic injection of 2cc Euflexxa was given under sterile technique using a 22g x 1.5 needle from the Anterolateral  aspect of the left Knee Joint in the seated position.      Marci Hollingsworth had no adverse reactions to the medication. She was instructed to apply ice to the joint for 20 minutes and avoid strenuous activities for 24-36 hours following the injection. She was warned of possible blood sugar and/or blood pressure changes during that time. Following that time, she can resume regular activities.    She was reminded to call the clinic immediately for any adverse side effects as explained in clinic today.    RTC in 1 week for Euflexxa series injections #2 of 3, sooner if needed.    Patient voices understanding of and agreement with treatment plan. All of the patient's questions were answered and the patient will contact us if they have any questions or concerns in the interim.

## 2022-04-28 ENCOUNTER — HOSPITAL ENCOUNTER (EMERGENCY)
Facility: HOSPITAL | Age: 47
Discharge: HOME | End: 2022-04-28
Attending: EMERGENCY MEDICINE
Payer: MEDICAID

## 2022-04-28 VITALS
OXYGEN SATURATION: 98 % | DIASTOLIC BLOOD PRESSURE: 92 MMHG | BODY MASS INDEX: 54.25 KG/M2 | TEMPERATURE: 97 F | RESPIRATION RATE: 18 BRPM | SYSTOLIC BLOOD PRESSURE: 148 MMHG | HEART RATE: 93 BPM | WEIGHT: 293 LBS

## 2022-04-28 DIAGNOSIS — J40 BRONCHITIS: ICD-10-CM

## 2022-04-28 DIAGNOSIS — J06.9 VIRAL UPPER RESPIRATORY TRACT INFECTION: ICD-10-CM

## 2022-04-28 DIAGNOSIS — R05.9 COUGH: Primary | ICD-10-CM

## 2022-04-28 PROCEDURE — 99284 EMERGENCY DEPT VISIT MOD MDM: CPT | Mod: 25

## 2022-04-28 RX ORDER — AZITHROMYCIN 250 MG/1
250 TABLET, FILM COATED ORAL DAILY
Qty: 6 TABLET | Refills: 0 | Status: SHIPPED | OUTPATIENT
Start: 2022-04-28 | End: 2022-05-05

## 2022-04-28 RX ORDER — ALBUTEROL SULFATE 90 UG/1
1-2 AEROSOL, METERED RESPIRATORY (INHALATION) EVERY 6 HOURS PRN
Qty: 6.7 G | Refills: 0 | Status: SHIPPED | OUTPATIENT
Start: 2022-04-28 | End: 2022-05-13

## 2022-04-28 RX ORDER — ALBUTEROL SULFATE 90 UG/1
1-2 AEROSOL, METERED RESPIRATORY (INHALATION) EVERY 6 HOURS PRN
Qty: 6.7 G | Refills: 0 | Status: SHIPPED | OUTPATIENT
Start: 2022-04-28 | End: 2022-04-28 | Stop reason: SDUPTHER

## 2022-04-29 NOTE — DISCHARGE INSTRUCTIONS
Tylenol for chest wall pain / return if short of breath or feeling worse or any other problems    29-Nov-2021 08:27

## 2022-04-29 NOTE — ED NOTES
Md explained discharge instructions and results with pt.  AVS printed, pt left before receiving AVS.  AVS left with  and pt called and returned to  AVS.

## 2022-04-29 NOTE — ED PROVIDER NOTES
Encounter Date: 4/28/2022       History     Chief Complaint   Patient presents with    Nasal Congestion     Pt c/o congestion and chest discomfort from coughing for 3 days.  Pt c/o non productive dry cough.  Pt denies fever, N/V     Cc: nasal congestion   hpi : 3 d of nasal congestion and cough and chest wall pain no sob   No fever   Chest wall pain worse w coughing     No wheezing daughter w similar symptoms           Review of patient's allergies indicates:   Allergen Reactions    Morphine Hives    Shellfish containing products Swelling    Citrus flavor Hives    Sulindac Itching    Penicillin Rash     Past Medical History:   Diagnosis Date    Diabetes mellitus     Diabetes mellitus, type 2     Hypertension     Thyroid disease      No past surgical history on file.  Family History   Problem Relation Age of Onset    Arthritis Mother     Cancer Father     Breast cancer Maternal Aunt      Social History     Tobacco Use    Smoking status: Never Smoker    Smokeless tobacco: Never Used   Substance Use Topics    Alcohol use: No    Drug use: No     Review of Systems   Constitutional: Negative for fever.   HENT: Negative for sore throat.    Respiratory: Positive for cough.    Cardiovascular: Negative for chest pain.   Gastrointestinal: Negative for nausea.   Genitourinary: Negative for dysuria.   Musculoskeletal: Negative for back pain.   Skin: Negative for rash.   Neurological: Negative for weakness.   Hematological: Does not bruise/bleed easily.   All other systems reviewed and are negative.      Physical Exam     Initial Vitals [04/28/22 2024]   BP Pulse Resp Temp SpO2   (!) 148/92 93 18 97.2 °F (36.2 °C) 98 %      MAP       --         Physical Exam    Nursing note and vitals reviewed.  Constitutional: She appears well-developed and well-nourished.   HENT:   Head: Normocephalic and atraumatic.   Eyes: EOM are normal. Pupils are equal, round, and reactive to light.   Neck: Neck supple.   Normal range of  motion.  Cardiovascular: Normal rate, regular rhythm and normal heart sounds.   Pulmonary/Chest: Breath sounds normal. She exhibits tenderness.   Abdominal: Abdomen is soft. Bowel sounds are normal.   Musculoskeletal:         General: Normal range of motion.      Cervical back: Normal range of motion and neck supple.     Neurological: She is alert and oriented to person, place, and time. She has normal strength. GCS score is 15. GCS eye subscore is 4. GCS verbal subscore is 5. GCS motor subscore is 6.   Skin: Skin is warm. Capillary refill takes less than 2 seconds.   Psychiatric: She has a normal mood and affect. Thought content normal.         ED Course   Procedures  Labs Reviewed - No data to display       Imaging Results    None          Medications - No data to display  Medical Decision Making:   Differential Diagnosis:   There is chest wall tenderness with reproduction of pain on physical exam she also has nasal congestion and cough.  The chest pain is likely secondary to bronchospasm will treat upper respiratory infection with antibiotics given she is at risk for progression to pneumonia given her other medical problems and her obesity.             ED Course as of 04/28/22 2053   Thu Apr 28, 2022 2026 Patient coughing runny nose chest congestion.  No shortness of breath.  Daughter with similar symptoms.  Will treat his upper respiratory infection. [AB]   2046 Xray no infiltrate and poor qual filmm due to body habitus possible increae markiings yet no crackles on exam and pt smiling in no distress and no hypoxia and w upper respiratory symptoms  [AB]      ED Course User Index  [AB] Modesto Perdomo MD             Clinical Impression:   Final diagnoses:  [R05.9] Cough (Primary)  [J06.9] Viral upper respiratory tract infection  [J40] Bronchitis          ED Disposition Condition    Discharge Stable        ED Prescriptions     Medication Sig Dispense Start Date End Date Auth. Provider    azithromycin (Z-RICK) 250  MG tablet Take 1 tablet (250 mg total) by mouth once daily. Take first 2 tablets together, then 1 every day until finished. 6 tablet 4/28/2022  Modesto Perdomo MD    albuterol (PROVENTIL/VENTOLIN HFA) 90 mcg/actuation inhaler  (Status: Discontinued) Inhale 1-2 puffs into the lungs every 6 (six) hours as needed for Wheezing. Rescue 6.7 g 4/28/2022 4/28/2022 Modesto Perdomo MD    albuterol (PROVENTIL/VENTOLIN HFA) 90 mcg/actuation inhaler Inhale 1-2 puffs into the lungs every 6 (six) hours as needed for Wheezing. Rescue 6.7 g 4/28/2022 5/2/2022 Modesto Perdomo MD        Follow-up Information    None          Modesto Perdomo MD  04/28/22 2053

## 2022-05-04 ENCOUNTER — TELEPHONE (OUTPATIENT)
Dept: ORTHOPEDICS | Facility: CLINIC | Age: 47
End: 2022-05-04
Payer: MEDICAID

## 2022-05-04 NOTE — TELEPHONE ENCOUNTER
----- Message from Valerie Bone MA sent at 2022  9:23 AM CDT -----  Contact: self  Marci Hollingsworth  MRN: [id number]  : 1975  PCP: Sofia Medina  Home Phone      [phone]  Work Phone      [phone]  Mobile          [phone]      MESSAGE:  Would like to r/s appt for injection she has today.      Phone:  [phone]

## 2022-05-04 NOTE — TELEPHONE ENCOUNTER
Rescheduled appointment with BRUCE Stringer per patient request to 5/5/2022. Voiced understanding.

## 2022-05-05 ENCOUNTER — OFFICE VISIT (OUTPATIENT)
Dept: ORTHOPEDICS | Facility: CLINIC | Age: 47
End: 2022-05-05
Payer: MEDICAID

## 2022-05-05 VITALS
HEART RATE: 91 BPM | HEIGHT: 66 IN | WEIGHT: 293 LBS | DIASTOLIC BLOOD PRESSURE: 68 MMHG | SYSTOLIC BLOOD PRESSURE: 112 MMHG | BODY MASS INDEX: 47.09 KG/M2 | RESPIRATION RATE: 18 BRPM

## 2022-05-05 DIAGNOSIS — M17.12 PRIMARY OSTEOARTHRITIS OF LEFT KNEE: Primary | ICD-10-CM

## 2022-05-05 PROCEDURE — 3078F PR MOST RECENT DIASTOLIC BLOOD PRESSURE < 80 MM HG: ICD-10-PCS | Mod: CPTII,,, | Performed by: PHYSICIAN ASSISTANT

## 2022-05-05 PROCEDURE — 1160F PR REVIEW ALL MEDS BY PRESCRIBER/CLIN PHARMACIST DOCUMENTED: ICD-10-PCS | Mod: CPTII,,, | Performed by: PHYSICIAN ASSISTANT

## 2022-05-05 PROCEDURE — 20610 DRAIN/INJ JOINT/BURSA W/O US: CPT | Mod: PBBFAC,LT | Performed by: PHYSICIAN ASSISTANT

## 2022-05-05 PROCEDURE — 3074F PR MOST RECENT SYSTOLIC BLOOD PRESSURE < 130 MM HG: ICD-10-PCS | Mod: CPTII,,, | Performed by: PHYSICIAN ASSISTANT

## 2022-05-05 PROCEDURE — 4010F ACE/ARB THERAPY RXD/TAKEN: CPT | Mod: CPTII,,, | Performed by: PHYSICIAN ASSISTANT

## 2022-05-05 PROCEDURE — 3008F PR BODY MASS INDEX (BMI) DOCUMENTED: ICD-10-PCS | Mod: CPTII,,, | Performed by: PHYSICIAN ASSISTANT

## 2022-05-05 PROCEDURE — 99999 PR PBB SHADOW E&M-EST. PATIENT-LVL III: ICD-10-PCS | Mod: PBBFAC,,, | Performed by: PHYSICIAN ASSISTANT

## 2022-05-05 PROCEDURE — 4010F PR ACE/ARB THEARPY RXD/TAKEN: ICD-10-PCS | Mod: CPTII,,, | Performed by: PHYSICIAN ASSISTANT

## 2022-05-05 PROCEDURE — 1159F MED LIST DOCD IN RCRD: CPT | Mod: CPTII,,, | Performed by: PHYSICIAN ASSISTANT

## 2022-05-05 PROCEDURE — 99499 NO LOS: ICD-10-PCS | Mod: S$PBB,,, | Performed by: PHYSICIAN ASSISTANT

## 2022-05-05 PROCEDURE — 20610 DRAIN/INJ JOINT/BURSA W/O US: CPT | Mod: S$PBB,LT,, | Performed by: PHYSICIAN ASSISTANT

## 2022-05-05 PROCEDURE — 1159F PR MEDICATION LIST DOCUMENTED IN MEDICAL RECORD: ICD-10-PCS | Mod: CPTII,,, | Performed by: PHYSICIAN ASSISTANT

## 2022-05-05 PROCEDURE — 99999 PR PBB SHADOW E&M-EST. PATIENT-LVL III: CPT | Mod: PBBFAC,,, | Performed by: PHYSICIAN ASSISTANT

## 2022-05-05 PROCEDURE — 99213 OFFICE O/P EST LOW 20 MIN: CPT | Mod: PBBFAC | Performed by: PHYSICIAN ASSISTANT

## 2022-05-05 PROCEDURE — 99499 UNLISTED E&M SERVICE: CPT | Mod: S$PBB,,, | Performed by: PHYSICIAN ASSISTANT

## 2022-05-05 PROCEDURE — 3008F BODY MASS INDEX DOCD: CPT | Mod: CPTII,,, | Performed by: PHYSICIAN ASSISTANT

## 2022-05-05 PROCEDURE — 20610 PR DRAIN/INJECT LARGE JOINT/BURSA: ICD-10-PCS | Mod: S$PBB,LT,, | Performed by: PHYSICIAN ASSISTANT

## 2022-05-05 PROCEDURE — 3074F SYST BP LT 130 MM HG: CPT | Mod: CPTII,,, | Performed by: PHYSICIAN ASSISTANT

## 2022-05-05 PROCEDURE — 3078F DIAST BP <80 MM HG: CPT | Mod: CPTII,,, | Performed by: PHYSICIAN ASSISTANT

## 2022-05-05 PROCEDURE — 1160F RVW MEDS BY RX/DR IN RCRD: CPT | Mod: CPTII,,, | Performed by: PHYSICIAN ASSISTANT

## 2022-05-05 RX ORDER — LIOTHYRONINE SODIUM 5 UG/1
5 TABLET ORAL DAILY
COMMUNITY

## 2022-05-05 RX ADMIN — Medication 20 MG: at 03:05

## 2022-05-05 NOTE — PROGRESS NOTES
Marci Hollingsworth is here for follow up of left knee arthritis. Pt is requesting Euflexxa series injections #2 of 3.  PMHx reviewed per encounter record. Has failed other conservative modalities including NSAIDS, activity modification, weight loss.    The prior injection was tolerated well.    PHYSICAL EXAMINATION:     General: The patient is alert and oriented x 3. Mood is pleasant.   Observation of ears, eyes and nose reveals no gross abnormalities. No   labored breathing observed.     No signs of infection or adverse reaction to knee.    Injection Procedure  A time out was performed, including verification of patient ID, procedure, site and side, availability of information and equipment, review of safety issues, and agreement with consent, the procedure site was marked.    After time out was performed, the patient was prepped aseptically with chloraprep. A diagnostic and therapeutic injection of 2cc Euflexxa was given under sterile technique using a 22g x 1.5 needle from the Anterolateral  aspect of the left Knee Joint in the seated position.      Marci Hollingsworth had no adverse reactions to the medication. She was instructed to apply ice to the joint for 20 minutes and avoid strenuous activities for 24-36 hours following the injection. She was warned of possible blood sugar and/or blood pressure changes during that time. Following that time, she can resume regular activities.    She was reminded to call the clinic immediately for any adverse side effects as explained in clinic today.    RTC in 1 week for Euflexxa series injections #3 of 3, sooner if needed.    Patient voices understanding of and agreement with treatment plan. All of the patient's questions were answered and the patient will contact us if they have any questions or concerns in the interim.

## 2022-05-13 ENCOUNTER — OFFICE VISIT (OUTPATIENT)
Dept: ORTHOPEDICS | Facility: CLINIC | Age: 47
End: 2022-05-13
Payer: MEDICAID

## 2022-05-13 VITALS
BODY MASS INDEX: 47.09 KG/M2 | DIASTOLIC BLOOD PRESSURE: 80 MMHG | WEIGHT: 293 LBS | RESPIRATION RATE: 20 BRPM | SYSTOLIC BLOOD PRESSURE: 114 MMHG | HEIGHT: 66 IN | HEART RATE: 88 BPM

## 2022-05-13 DIAGNOSIS — M17.12 PRIMARY OSTEOARTHRITIS OF LEFT KNEE: Primary | ICD-10-CM

## 2022-05-13 PROCEDURE — 20610 PR DRAIN/INJECT LARGE JOINT/BURSA: ICD-10-PCS | Mod: S$PBB,LT,, | Performed by: PHYSICIAN ASSISTANT

## 2022-05-13 PROCEDURE — 20610 DRAIN/INJ JOINT/BURSA W/O US: CPT | Mod: S$PBB,LT,, | Performed by: PHYSICIAN ASSISTANT

## 2022-05-13 PROCEDURE — 4010F ACE/ARB THERAPY RXD/TAKEN: CPT | Mod: CPTII,,, | Performed by: PHYSICIAN ASSISTANT

## 2022-05-13 PROCEDURE — 99214 OFFICE O/P EST MOD 30 MIN: CPT | Mod: PBBFAC,25 | Performed by: PHYSICIAN ASSISTANT

## 2022-05-13 PROCEDURE — 4010F PR ACE/ARB THEARPY RXD/TAKEN: ICD-10-PCS | Mod: CPTII,,, | Performed by: PHYSICIAN ASSISTANT

## 2022-05-13 PROCEDURE — 3079F PR MOST RECENT DIASTOLIC BLOOD PRESSURE 80-89 MM HG: ICD-10-PCS | Mod: CPTII,,, | Performed by: PHYSICIAN ASSISTANT

## 2022-05-13 PROCEDURE — 99499 UNLISTED E&M SERVICE: CPT | Mod: S$PBB,,, | Performed by: PHYSICIAN ASSISTANT

## 2022-05-13 PROCEDURE — 1159F PR MEDICATION LIST DOCUMENTED IN MEDICAL RECORD: ICD-10-PCS | Mod: CPTII,,, | Performed by: PHYSICIAN ASSISTANT

## 2022-05-13 PROCEDURE — 99999 PR PBB SHADOW E&M-EST. PATIENT-LVL IV: ICD-10-PCS | Mod: PBBFAC,,, | Performed by: PHYSICIAN ASSISTANT

## 2022-05-13 PROCEDURE — 99499 NO LOS: ICD-10-PCS | Mod: S$PBB,,, | Performed by: PHYSICIAN ASSISTANT

## 2022-05-13 PROCEDURE — 99999 PR PBB SHADOW E&M-EST. PATIENT-LVL IV: CPT | Mod: PBBFAC,,, | Performed by: PHYSICIAN ASSISTANT

## 2022-05-13 PROCEDURE — 3008F PR BODY MASS INDEX (BMI) DOCUMENTED: ICD-10-PCS | Mod: CPTII,,, | Performed by: PHYSICIAN ASSISTANT

## 2022-05-13 PROCEDURE — 3074F SYST BP LT 130 MM HG: CPT | Mod: CPTII,,, | Performed by: PHYSICIAN ASSISTANT

## 2022-05-13 PROCEDURE — 20610 DRAIN/INJ JOINT/BURSA W/O US: CPT | Mod: PBBFAC,LT | Performed by: PHYSICIAN ASSISTANT

## 2022-05-13 PROCEDURE — 1160F PR REVIEW ALL MEDS BY PRESCRIBER/CLIN PHARMACIST DOCUMENTED: ICD-10-PCS | Mod: CPTII,,, | Performed by: PHYSICIAN ASSISTANT

## 2022-05-13 PROCEDURE — 3074F PR MOST RECENT SYSTOLIC BLOOD PRESSURE < 130 MM HG: ICD-10-PCS | Mod: CPTII,,, | Performed by: PHYSICIAN ASSISTANT

## 2022-05-13 PROCEDURE — 1160F RVW MEDS BY RX/DR IN RCRD: CPT | Mod: CPTII,,, | Performed by: PHYSICIAN ASSISTANT

## 2022-05-13 PROCEDURE — 3079F DIAST BP 80-89 MM HG: CPT | Mod: CPTII,,, | Performed by: PHYSICIAN ASSISTANT

## 2022-05-13 PROCEDURE — 1159F MED LIST DOCD IN RCRD: CPT | Mod: CPTII,,, | Performed by: PHYSICIAN ASSISTANT

## 2022-05-13 PROCEDURE — 3008F BODY MASS INDEX DOCD: CPT | Mod: CPTII,,, | Performed by: PHYSICIAN ASSISTANT

## 2022-05-13 RX ADMIN — Medication 20 MG: at 09:05

## 2022-05-13 NOTE — PROGRESS NOTES
Marci Hollingsworth is here for follow up of left knee arthritis. Pt is requesting Euflexxa series injections #3 of 3.  PMHx reviewed per encounter record. Has failed other conservative modalities including NSAIDS, activity modification, weight loss.    The prior injection was tolerated well.    PHYSICAL EXAMINATION:     General: The patient is alert and oriented x 3. Mood is pleasant.   Observation of ears, eyes and nose reveals no gross abnormalities. No   labored breathing observed.     No signs of infection or adverse reaction to knee.    Injection Procedure  A time out was performed, including verification of patient ID, procedure, site and side, availability of information and equipment, review of safety issues, and agreement with consent, the procedure site was marked.    After time out was performed, the patient was prepped aseptically with chloraprep. A diagnostic and therapeutic injection of 2cc Euflexxa was given under sterile technique using a 22g x 1.5 needle from the Anterolateral  aspect of the left Knee Joint in the seated position.      Marci Hollingsworth had no adverse reactions to the medication. She was instructed to apply ice to the joint for 20 minutes and avoid strenuous activities for 24-36 hours following the injection. She was warned of possible blood sugar and/or blood pressure changes during that time. Following that time, she can resume regular activities.    She was reminded to call the clinic immediately for any adverse side effects as explained in clinic today.    RTC in 6 weeks for follow up, sooner if needed.    Patient voices understanding of and agreement with treatment plan. All of the patient's questions were answered and the patient will contact us if they have any questions or concerns in the interim.

## 2022-08-25 ENCOUNTER — HOSPITAL ENCOUNTER (OUTPATIENT)
Dept: RADIOLOGY | Facility: HOSPITAL | Age: 47
Discharge: HOME | End: 2022-08-25
Attending: NURSE PRACTITIONER
Payer: MEDICAID

## 2022-08-25 VITALS — HEIGHT: 66 IN | BODY MASS INDEX: 47.09 KG/M2 | WEIGHT: 293 LBS

## 2022-08-25 DIAGNOSIS — Z12.31 BREAST CANCER SCREENING BY MAMMOGRAM: ICD-10-CM

## 2022-08-25 PROCEDURE — 77063 MAMMO DIGITAL SCREENING BILAT WITH TOMO: ICD-10-PCS | Mod: 26,,, | Performed by: RADIOLOGY

## 2022-08-25 PROCEDURE — 77063 BREAST TOMOSYNTHESIS BI: CPT | Mod: TC

## 2022-08-25 PROCEDURE — 77067 MAMMO DIGITAL SCREENING BILAT WITH TOMO: ICD-10-PCS | Mod: 26,,, | Performed by: RADIOLOGY

## 2022-08-25 PROCEDURE — 77067 SCR MAMMO BI INCL CAD: CPT | Mod: TC

## 2022-08-25 PROCEDURE — 77063 BREAST TOMOSYNTHESIS BI: CPT | Mod: 26,,, | Performed by: RADIOLOGY

## 2022-08-25 PROCEDURE — 77067 SCR MAMMO BI INCL CAD: CPT | Mod: 26,,, | Performed by: RADIOLOGY

## 2023-03-06 ENCOUNTER — HOSPITAL ENCOUNTER (OUTPATIENT)
Dept: RADIOLOGY | Facility: HOSPITAL | Age: 48
Discharge: HOME | End: 2023-03-06
Attending: NURSE PRACTITIONER
Payer: MEDICAID
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
## 2023-03-06 DIAGNOSIS — M54.50 LOW BACK PAIN: ICD-10-CM

## 2023-03-06 DIAGNOSIS — M25.512 LEFT SHOULDER PAIN: ICD-10-CM

## 2023-03-06 PROCEDURE — 73030 X-RAY EXAM OF SHOULDER: CPT | Mod: 26,LT,, | Performed by: RADIOLOGY

## 2023-03-06 PROCEDURE — 72100 X-RAY EXAM L-S SPINE 2/3 VWS: CPT | Mod: TC

## 2023-03-06 PROCEDURE — 72100 X-RAY EXAM L-S SPINE 2/3 VWS: CPT | Mod: 26,,, | Performed by: RADIOLOGY

## 2023-03-06 PROCEDURE — 73030 XR SHOULDER COMPLETE 2 OR MORE VIEWS LEFT: ICD-10-PCS | Mod: 26,LT,, | Performed by: RADIOLOGY

## 2023-03-06 PROCEDURE — 72100 XR LUMBAR SPINE 2 OR 3 VIEWS: ICD-10-PCS | Mod: 26,,, | Performed by: RADIOLOGY

## 2023-03-06 PROCEDURE — 73030 X-RAY EXAM OF SHOULDER: CPT | Mod: TC,LT

## 2023-08-30 ENCOUNTER — HOSPITAL ENCOUNTER (OUTPATIENT)
Dept: RADIOLOGY | Facility: HOSPITAL | Age: 48
Discharge: HOME | End: 2023-08-30
Attending: NURSE PRACTITIONER
Payer: MEDICAID

## 2023-08-30 VITALS — HEIGHT: 66 IN | BODY MASS INDEX: 47.09 KG/M2 | WEIGHT: 293 LBS

## 2023-08-30 DIAGNOSIS — Z12.31 ENCOUNTER FOR SCREENING MAMMOGRAM FOR BREAST CANCER: ICD-10-CM

## 2023-08-30 PROCEDURE — 77067 SCR MAMMO BI INCL CAD: CPT | Mod: TC

## 2023-08-30 PROCEDURE — 77063 MAMMO DIGITAL SCREENING BILAT WITH TOMO: ICD-10-PCS | Mod: 26,,, | Performed by: RADIOLOGY

## 2023-08-30 PROCEDURE — 77067 MAMMO DIGITAL SCREENING BILAT WITH TOMO: ICD-10-PCS | Mod: 26,,, | Performed by: RADIOLOGY

## 2023-08-30 PROCEDURE — 77063 BREAST TOMOSYNTHESIS BI: CPT | Mod: 26,,, | Performed by: RADIOLOGY

## 2023-08-30 PROCEDURE — 77067 SCR MAMMO BI INCL CAD: CPT | Mod: 26,,, | Performed by: RADIOLOGY

## 2023-09-15 DIAGNOSIS — M25.572 LEFT ANKLE PAIN, UNSPECIFIED CHRONICITY: Primary | ICD-10-CM

## 2023-12-06 ENCOUNTER — HOSPITAL ENCOUNTER (OUTPATIENT)
Dept: RADIOLOGY | Facility: HOSPITAL | Age: 48
Discharge: HOME | End: 2023-12-06
Attending: NURSE PRACTITIONER
Payer: MEDICAID

## 2023-12-06 DIAGNOSIS — R19.00 ABDOMINAL SWELLING: ICD-10-CM

## 2023-12-06 PROCEDURE — 76705 ECHO EXAM OF ABDOMEN: CPT | Mod: TC

## 2023-12-06 PROCEDURE — 76705 US SOFT TISSUE, ABDOMEN: ICD-10-PCS | Mod: 26,,, | Performed by: RADIOLOGY

## 2023-12-06 PROCEDURE — 76705 ECHO EXAM OF ABDOMEN: CPT | Mod: 26,,, | Performed by: RADIOLOGY

## 2024-05-29 PROBLEM — E11.65 CONTROLLED TYPE 2 DIABETES MELLITUS WITH HYPERGLYCEMIA, WITHOUT LONG-TERM CURRENT USE OF INSULIN: Status: ACTIVE | Noted: 2024-05-29

## 2024-05-29 PROBLEM — E66.9 OBESITY (BMI 30-39.9): Status: ACTIVE | Noted: 2024-05-29

## 2024-05-29 PROBLEM — E78.2 MIXED HYPERLIPIDEMIA: Status: ACTIVE | Noted: 2024-05-29

## 2024-06-06 ENCOUNTER — HOSPITAL ENCOUNTER (OUTPATIENT)
Dept: RADIOLOGY | Facility: HOSPITAL | Age: 49
Discharge: HOME | End: 2024-06-06
Attending: NURSE PRACTITIONER
Payer: MEDICAID

## 2024-06-06 DIAGNOSIS — M25.572 LEFT ANKLE PAIN, UNSPECIFIED CHRONICITY: ICD-10-CM

## 2024-06-06 PROCEDURE — 73610 X-RAY EXAM OF ANKLE: CPT | Mod: 26,LT,, | Performed by: RADIOLOGY

## 2024-06-06 PROCEDURE — 73610 X-RAY EXAM OF ANKLE: CPT | Mod: TC,LT

## 2024-07-17 PROBLEM — E66.01 MORBID OBESITY WITH BMI OF 50.0-59.9, ADULT: Status: ACTIVE | Noted: 2024-05-29

## 2024-08-02 ENCOUNTER — PATIENT OUTREACH (OUTPATIENT)
Dept: ADMINISTRATIVE | Facility: HOSPITAL | Age: 49
End: 2024-08-02
Payer: MEDICAID

## 2024-08-22 PROBLEM — T46.4X5A ANGIOEDEMA DUE TO ANGIOTENSIN CONVERTING ENZYME INHIBITOR (ACE-I): Status: ACTIVE | Noted: 2024-08-22

## 2024-08-22 PROBLEM — T78.3XXA ANGIOEDEMA DUE TO ANGIOTENSIN CONVERTING ENZYME INHIBITOR (ACE-I): Status: ACTIVE | Noted: 2024-08-22

## 2024-08-22 PROBLEM — R10.9 ABDOMINAL CRAMPING: Status: ACTIVE | Noted: 2024-08-22

## 2024-09-01 ENCOUNTER — ON-DEMAND VIRTUAL (OUTPATIENT)
Dept: URGENT CARE | Facility: CLINIC | Age: 49
End: 2024-09-01
Payer: MEDICAID

## 2024-09-01 DIAGNOSIS — J98.8 VIRAL RESPIRATORY ILLNESS: Primary | ICD-10-CM

## 2024-09-01 DIAGNOSIS — B97.89 VIRAL RESPIRATORY ILLNESS: Primary | ICD-10-CM

## 2024-09-01 PROCEDURE — 99213 OFFICE O/P EST LOW 20 MIN: CPT | Mod: 95,,,

## 2024-09-01 RX ORDER — FLUTICASONE PROPIONATE 50 MCG
1 SPRAY, SUSPENSION (ML) NASAL DAILY
Qty: 9.9 ML | Refills: 0 | Status: SHIPPED | OUTPATIENT
Start: 2024-09-01

## 2024-09-01 RX ORDER — BENZONATATE 100 MG/1
100 CAPSULE ORAL 3 TIMES DAILY PRN
Qty: 30 CAPSULE | Refills: 0 | Status: SHIPPED | OUTPATIENT
Start: 2024-09-01 | End: 2024-09-11

## 2024-09-01 NOTE — PROGRESS NOTES
Subjective:      Patient ID: Marci Hollingsworth is a 49 y.o. female in car    Vitals:  vitals were not taken for this visit.     Chief Complaint: Sinus Problem      Visit Type: TELE AUDIOVISUAL    Present with the patient at the time of consultation: TELEMED PRESENT WITH PATIENT: None    Past Medical History:   Diagnosis Date    Diabetes mellitus     Diabetes mellitus, type 2     Hypertension     Thyroid disease      History reviewed. No pertinent surgical history.  Review of patient's allergies indicates:   Allergen Reactions    Lisinopril Swelling    Morphine Hives    Shellfish containing products Swelling    Citrus flavor Hives    Sulindac Itching    Penicillin Rash     Current Outpatient Medications on File Prior to Visit   Medication Sig Dispense Refill    aspirin 81 MG Chew Take 81 mg by mouth once daily.      blood sugar diagnostic Strp To check BG 2 times daily, to use with insurance preferred meter 100 strip 11    blood-glucose meter kit To check BG 2 times daily, to use with insurance preferred meter 1 each 0    empagliflozin (JARDIANCE) 25 mg tablet Take 1 tablet (25 mg total) by mouth once daily. For sugar control.  New dose as of 08/22/2024 90 tablet 3    hydroCHLOROthiazide (HYDRODIURIL) 25 MG tablet Take 1 tablet (25 mg total) by mouth once daily. Fluid pill / blood pressure pill.  New as of 08/22/2024.  Replaces lisinopril. 90 tablet 3    HYDROcodone-acetaminophen (NORCO)  mg per tablet Take 1 tablet by mouth every 12 (twelve) hours as needed for Pain. 60 tablet 0    lancets Misc To check BG 2 times daily, to use with insurance preferred meter 100 each 11    levothyroxine (SYNTHROID) 25 MCG tablet 1/2 tablet in the morning on an empty stomach 90 tablet 3    liothyronine (CYTOMEL) 5 MCG Tab Take 1 tablet (5 mcg total) by mouth once daily. Thyroid medication #2 90 tablet 3    metoprolol succinate (TOPROL-XL) 100 MG 24 hr tablet Take 1 tablet (100 mg total) by mouth every evening. Blood  pressure pill.  Slows the heart rate. 90 tablet 3    naproxen (NAPROSYN) 500 MG tablet Take 1 tablet (500 mg total) by mouth 2 (two) times daily as needed (pain). 60 tablet 5    rosuvastatin (CRESTOR) 5 MG tablet Take 1 tablet (5 mg total) by mouth every evening. For cholesterol and to reduce risk of heart attacks and strokes 90 tablet 3    tirzepatide (MOUNJARO) 5 mg/0.5 mL PnIj Inject 5 mg into the skin every 7 days. If tolerating well then contact Dr. Villagomez around 4th dose to increase to 7.5 mg. 4 Pen 1     No current facility-administered medications on file prior to visit.     Family History   Problem Relation Name Age of Onset    Arthritis Mother      Cancer Father      Breast cancer Maternal Aunt             Ohs Peq Odvv Intake    9/1/2024  5:51 PM CDT - Filed by Patient   What is your current physical address in the event of a medical emergency? [address]   Are you able to take your vital signs? No   Please attach any relevant images or files          Pt states that for the past two days she has had a cough, nasal drainage, congestion, watery eyes and a scratch throat. Pt denies any fever, sob, chest pain or other symptoms at this time. Pt states that she has not taken a covid test. Pt states that she is taking otc nightquil for her symptoms.         Objective:   The physical exam was conducted virtually.  Physical Exam   Constitutional: She is oriented to person, place, and time.   HENT:   Head: Normocephalic and atraumatic.   Nose: Congestion present.   Eyes: Conjunctivae are normal. Pupils are equal, round, and reactive to light. Extraocular movement intact   Pulmonary/Chest: Effort normal.   Abdominal: Normal appearance.   Neurological: no focal deficit. She is alert, oriented to person, place, and time and at baseline.   Skin: Skin is warm.   Psychiatric: Her behavior is normal. Mood, judgment and thought content normal.       Assessment:     1. Viral respiratory illness        Plan:        Viral respiratory illness

## 2024-09-27 ENCOUNTER — PATIENT OUTREACH (OUTPATIENT)
Dept: ADMINISTRATIVE | Facility: HOSPITAL | Age: 49
End: 2024-09-27
Payer: MEDICAID

## 2024-09-27 NOTE — PROGRESS NOTES
Attempted to contact pt in reference to overdue  Dm eye exam, mammogram and  cervical cancer screening. Unable to contact. No answer. Message left

## 2024-11-12 ENCOUNTER — TELEPHONE (OUTPATIENT)
Dept: ORTHOPEDICS | Facility: CLINIC | Age: 49
End: 2024-11-12
Payer: MEDICAID

## 2024-11-12 NOTE — TELEPHONE ENCOUNTER
Called and offered next available  at 2:00. Pt accepted          ----- Message from Cassandra Middleton PA-C sent at 2024 10:53 AM CST -----  Contact: PATIENT  We can see her next available  ----- Message -----  From: Mary Antonio MA  Sent: 2024  10:46 AM CST  To: Cassandra Middleton PA-C      ----- Message -----  From: Malgorzata Norris  Sent: 2024  10:40 AM CST  To: [facility] Trujillo Staff    Marci Hollingsworth  MRN: [id number]  : 1975  PCP: Jose Villagomez  Home Phone      [phone]  Work Phone      Not on file.  Mobile          [phone]      MESSAGE: Patient has seen Cassandra in the patient for knee injections but has now developed growth spurs to her foot and ankle.  She would like to know if Cassandra treats this.          Phone: [phone]

## 2024-12-04 ENCOUNTER — OFFICE VISIT (OUTPATIENT)
Dept: ORTHOPEDICS | Facility: CLINIC | Age: 49
End: 2024-12-04
Payer: MEDICAID

## 2024-12-04 VITALS
RESPIRATION RATE: 16 BRPM | WEIGHT: 293 LBS | HEIGHT: 66 IN | DIASTOLIC BLOOD PRESSURE: 96 MMHG | SYSTOLIC BLOOD PRESSURE: 132 MMHG | BODY MASS INDEX: 47.09 KG/M2 | OXYGEN SATURATION: 97 % | HEART RATE: 101 BPM

## 2024-12-04 DIAGNOSIS — M25.572 ACUTE LEFT ANKLE PAIN: ICD-10-CM

## 2024-12-04 DIAGNOSIS — M79.672 PAIN OF LEFT HEEL: Primary | ICD-10-CM

## 2024-12-04 PROCEDURE — 99214 OFFICE O/P EST MOD 30 MIN: CPT | Mod: S$PBB,,, | Performed by: PHYSICIAN ASSISTANT

## 2024-12-04 PROCEDURE — 99214 OFFICE O/P EST MOD 30 MIN: CPT | Mod: PBBFAC | Performed by: PHYSICIAN ASSISTANT

## 2024-12-04 PROCEDURE — 3044F HG A1C LEVEL LT 7.0%: CPT | Mod: CPTII,,, | Performed by: PHYSICIAN ASSISTANT

## 2024-12-04 PROCEDURE — 3061F NEG MICROALBUMINURIA REV: CPT | Mod: CPTII,,, | Performed by: PHYSICIAN ASSISTANT

## 2024-12-04 PROCEDURE — 3075F SYST BP GE 130 - 139MM HG: CPT | Mod: CPTII,,, | Performed by: PHYSICIAN ASSISTANT

## 2024-12-04 PROCEDURE — 1159F MED LIST DOCD IN RCRD: CPT | Mod: CPTII,,, | Performed by: PHYSICIAN ASSISTANT

## 2024-12-04 PROCEDURE — 1160F RVW MEDS BY RX/DR IN RCRD: CPT | Mod: CPTII,,, | Performed by: PHYSICIAN ASSISTANT

## 2024-12-04 PROCEDURE — 99999 PR PBB SHADOW E&M-EST. PATIENT-LVL IV: CPT | Mod: PBBFAC,,, | Performed by: PHYSICIAN ASSISTANT

## 2024-12-04 PROCEDURE — 3080F DIAST BP >= 90 MM HG: CPT | Mod: CPTII,,, | Performed by: PHYSICIAN ASSISTANT

## 2024-12-04 PROCEDURE — 4010F ACE/ARB THERAPY RXD/TAKEN: CPT | Mod: CPTII,,, | Performed by: PHYSICIAN ASSISTANT

## 2024-12-04 PROCEDURE — 3008F BODY MASS INDEX DOCD: CPT | Mod: CPTII,,, | Performed by: PHYSICIAN ASSISTANT

## 2024-12-04 PROCEDURE — 3066F NEPHROPATHY DOC TX: CPT | Mod: CPTII,,, | Performed by: PHYSICIAN ASSISTANT

## 2024-12-04 NOTE — PROGRESS NOTES
Subjective:      Patient ID: Marci Hollingsworth is a 49 y.o. female.    Chief Complaint: Pain of the Left Foot    Review of patient's allergies indicates:   Allergen Reactions    Lisinopril Swelling    Morphine Hives    Shellfish containing products Swelling    Citrus flavor Hives    Sulindac Itching    Penicillin Rash      48 yo F presents to clinic with c/o left foot and ankle pain x few months.  No injury or trauma.  Achy pain to lateral ankle as well as sharp pain to plantar heel.  Worse with walking and improves some with rest.  Occasional swelling to foot but no redness or warmth.  No numbness/tingling.  She has not tried any treatment for this yet, but says that she does have prescription naproxen at home.        Review of Systems   Constitutional: Negative for chills, diaphoresis and fever.   HENT:  Negative for congestion, ear discharge and ear pain.    Eyes:  Negative for blurred vision, discharge, double vision and pain.   Cardiovascular:  Negative for chest pain, claudication and cyanosis.   Respiratory:  Negative for cough, hemoptysis and shortness of breath.    Endocrine: Negative for cold intolerance and heat intolerance.   Skin:  Negative for color change, dry skin, itching and rash.   Musculoskeletal:  Positive for joint pain and joint swelling. Negative for arthritis, back pain, falls, gout, muscle weakness and neck pain.   Gastrointestinal:  Negative for abdominal pain and change in bowel habit.   Neurological:  Negative for brief paralysis, disturbances in coordination, dizziness, numbness and paresthesias.   Psychiatric/Behavioral:  Negative for altered mental status and depression.          Objective:          General    Constitutional: She is oriented to person, place, and time. She appears well-developed and well-nourished. No distress.   HENT:   Head: Atraumatic.   Eyes: EOM are normal. Right eye exhibits no discharge. Left eye exhibits no discharge.   Cardiovascular:  Normal rate.             Pulmonary/Chest: Effort normal. No respiratory distress.   Abdominal: Soft.   Neurological: She is alert and oriented to person, place, and time.   Psychiatric: She has a normal mood and affect. Her behavior is normal.         Right Ankle/Foot Exam     Inspection   Deformity: absent  Erythema: absent  Bruising: Ankle - absent Foot - absent    Range of Motion   The patient has normal right ankle ROM.  Whyte Test:  negative  First MTP Joint: normal    Muscle Strength   The patient has normal right ankle strength.    Other   Sensation: normal    Left Ankle/Foot Exam     Inspection  Deformity: absent  Erythema: absent  Bruising: Ankle - absent Foot - absent    Tenderness   The patient is tender to palpation of the lateral malleolus.    Range of Motion   The patient has normal left ankle ROM.   Whyte Test:  normal  First MTP Joint: normal    Muscle Strength   The patient has normal left ankle strength.    Other   Sensation: normal    Comments:  Tenderness to plantar heel      Vascular Exam       Edema  Right Lower Leg: absent  Left Lower Leg: absent              Assessment:         Xray Left Ankle 6/6/24:  The alignment is within normal limits. No displaced fractures identified. No evidence of lytic or blastic lesions.Joint spaces are unremarkable.Moderate-sized plantar and posterior calcaneal spurs. Dorsal spurring at the midfoot.Soft tissue swelling laterally.     Encounter Diagnoses   Name Primary?    Pain of left heel Yes    Acute left ankle pain     Pain of left heel    Acute left ankle pain               Plan:         We discussed diagnosis and treatment options. Pt would like to try boot and PT at this time. She will also try naproxen that was prescribed by PCP.      Boot with ambulation.  Naproxen as prescribed by PCP as needed.  Ice compress to the affected area 2-3x a day for 15-20 minutes as needed for pain management.  PT referral.  Foot and ankle conditioning home exercise program. [employer] handout of  exercises provided to patient.  RTC in 6 weeks for follow up, sooner if needed.    Patient voices understanding of and agreement with treatment plan. All of the patient's questions were answered, and the patient will contact us if she has any questions or concerns in the interim.

## 2024-12-06 ENCOUNTER — PATIENT MESSAGE (OUTPATIENT)
Dept: ADMINISTRATIVE | Facility: HOSPITAL | Age: 49
End: 2024-12-06
Payer: MEDICAID

## 2024-12-09 ENCOUNTER — TELEPHONE (OUTPATIENT)
Dept: ORTHOPEDICS | Facility: CLINIC | Age: 49
End: 2024-12-09
Payer: MEDICAID

## 2024-12-09 NOTE — TELEPHONE ENCOUNTER
Called and spoke with pt, she is wanting to know if she can have a shorter boot because the one she is in is long and heavy. I explained to patient that a shorter boot will still be heavy I would have to get with Cassandra and see if a short boot is appropriate since it is her heel that is hurting and I will get back with her tomorrow since Cassandra is out of clinic today. Pt voiced understanding.       ----- Message from Malgorzata sent at 2024 11:17 AM CST -----  Contact: PATIENT  Marci Hollingsworth  MRN: [id number]  : 1975  PCP: Jose Villagomez  Home Phone      [phone]  Work Phone      Not on file.  Mobile          [phone]      MESSAGE: Patient would like a return call regarding the boot that she was given at her last office visit.          Phone: [phone]

## 2024-12-10 ENCOUNTER — PATIENT MESSAGE (OUTPATIENT)
Dept: ORTHOPEDICS | Facility: CLINIC | Age: 49
End: 2024-12-10
Payer: MEDICAID

## 2024-12-16 ENCOUNTER — PATIENT MESSAGE (OUTPATIENT)
Dept: ORTHOPEDICS | Facility: CLINIC | Age: 49
End: 2024-12-16
Payer: MEDICAID

## 2024-12-30 ENCOUNTER — HOSPITAL ENCOUNTER (OUTPATIENT)
Dept: RADIOLOGY | Facility: HOSPITAL | Age: 49
Discharge: HOME | End: 2024-12-30
Attending: INTERNAL MEDICINE
Payer: MEDICAID

## 2024-12-30 VITALS — HEIGHT: 66 IN | BODY MASS INDEX: 47.09 KG/M2 | WEIGHT: 293 LBS

## 2024-12-30 DIAGNOSIS — Z12.31 SCREENING MAMMOGRAM, ENCOUNTER FOR: ICD-10-CM

## 2024-12-30 PROCEDURE — 77067 SCR MAMMO BI INCL CAD: CPT | Mod: TC

## 2024-12-30 PROCEDURE — 77063 BREAST TOMOSYNTHESIS BI: CPT | Mod: 26,,, | Performed by: RADIOLOGY

## 2024-12-30 PROCEDURE — 77067 SCR MAMMO BI INCL CAD: CPT | Mod: 26,,, | Performed by: RADIOLOGY

## 2025-01-10 ENCOUNTER — LAB VISIT (OUTPATIENT)
Dept: LAB | Facility: HOSPITAL | Age: 50
End: 2025-01-10
Attending: INTERNAL MEDICINE
Payer: MEDICAID

## 2025-01-10 DIAGNOSIS — E78.2 MIXED HYPERLIPIDEMIA: ICD-10-CM

## 2025-01-10 DIAGNOSIS — E03.4 HYPOTHYROIDISM DUE TO ACQUIRED ATROPHY OF THYROID: ICD-10-CM

## 2025-01-10 DIAGNOSIS — I10 ESSENTIAL HYPERTENSION: ICD-10-CM

## 2025-01-10 DIAGNOSIS — E11.65 CONTROLLED TYPE 2 DIABETES MELLITUS WITH HYPERGLYCEMIA, WITHOUT LONG-TERM CURRENT USE OF INSULIN: ICD-10-CM

## 2025-01-10 DIAGNOSIS — M17.0 BILATERAL PRIMARY OSTEOARTHRITIS OF KNEE: Chronic | ICD-10-CM

## 2025-01-10 DIAGNOSIS — E66.9 OBESITY (BMI 30-39.9): ICD-10-CM

## 2025-01-10 LAB
ALBUMIN SERPL BCP-MCNC: 3.4 G/DL (ref 3.5–5.2)
ALP SERPL-CCNC: 81 U/L (ref 40–150)
ALT SERPL W/O P-5'-P-CCNC: 12 U/L (ref 10–44)
ANION GAP SERPL CALC-SCNC: 9 MMOL/L (ref 8–16)
AST SERPL-CCNC: 13 U/L (ref 10–40)
BACTERIA #/AREA URNS HPF: ABNORMAL /HPF
BASOPHILS # BLD AUTO: 0.05 K/UL (ref 0–0.2)
BASOPHILS NFR BLD: 0.5 % (ref 0–1.9)
BILIRUB SERPL-MCNC: 0.6 MG/DL (ref 0.1–1)
BILIRUB UR QL STRIP: NEGATIVE
BUN SERPL-MCNC: 14 MG/DL (ref 6–20)
CALCIUM SERPL-MCNC: 9.3 MG/DL (ref 8.7–10.5)
CHLORIDE SERPL-SCNC: 105 MMOL/L (ref 95–110)
CHOLEST SERPL-MCNC: 157 MG/DL (ref 120–199)
CHOLEST/HDLC SERPL: 4.1 {RATIO} (ref 2–5)
CLARITY UR: CLEAR
CO2 SERPL-SCNC: 26 MMOL/L (ref 23–29)
COLOR UR: YELLOW
CREAT SERPL-MCNC: 0.7 MG/DL (ref 0.5–1.4)
DIFFERENTIAL METHOD BLD: ABNORMAL
EOSINOPHIL # BLD AUTO: 0.7 K/UL (ref 0–0.5)
EOSINOPHIL NFR BLD: 8 % (ref 0–8)
ERYTHROCYTE [DISTWIDTH] IN BLOOD BY AUTOMATED COUNT: 13.3 % (ref 11.5–14.5)
EST. GFR  (NO RACE VARIABLE): >60 ML/MIN/1.73 M^2
ESTIMATED AVG GLUCOSE: 117 MG/DL (ref 68–131)
GLUCOSE SERPL-MCNC: 106 MG/DL (ref 70–110)
GLUCOSE UR QL STRIP: ABNORMAL
HBA1C MFR BLD: 5.7 % (ref 4–5.6)
HCT VFR BLD AUTO: 43.6 % (ref 37–48.5)
HDLC SERPL-MCNC: 38 MG/DL (ref 40–75)
HDLC SERPL: 24.2 % (ref 20–50)
HGB BLD-MCNC: 14.6 G/DL (ref 12–16)
HGB UR QL STRIP: NEGATIVE
IMM GRANULOCYTES # BLD AUTO: 0.02 K/UL (ref 0–0.04)
IMM GRANULOCYTES NFR BLD AUTO: 0.2 % (ref 0–0.5)
KETONES UR QL STRIP: NEGATIVE
LDLC SERPL CALC-MCNC: 96.8 MG/DL (ref 63–159)
LEUKOCYTE ESTERASE UR QL STRIP: NEGATIVE
LYMPHOCYTES # BLD AUTO: 3.3 K/UL (ref 1–4.8)
LYMPHOCYTES NFR BLD: 35.7 % (ref 18–48)
MCH RBC QN AUTO: 29.5 PG (ref 27–31)
MCHC RBC AUTO-ENTMCNC: 33.5 G/DL (ref 32–36)
MCV RBC AUTO: 88 FL (ref 82–98)
MICROSCOPIC COMMENT: ABNORMAL
MONOCYTES # BLD AUTO: 0.7 K/UL (ref 0.3–1)
MONOCYTES NFR BLD: 7.5 % (ref 4–15)
NEUTROPHILS # BLD AUTO: 4.4 K/UL (ref 1.8–7.7)
NEUTROPHILS NFR BLD: 48.1 % (ref 38–73)
NITRITE UR QL STRIP: NEGATIVE
NONHDLC SERPL-MCNC: 119 MG/DL
NRBC BLD-RTO: 0 /100 WBC
PH UR STRIP: 6 [PH] (ref 5–8)
PLATELET # BLD AUTO: 405 K/UL (ref 150–450)
PMV BLD AUTO: 9.1 FL (ref 9.2–12.9)
POTASSIUM SERPL-SCNC: 3.9 MMOL/L (ref 3.5–5.1)
PROT SERPL-MCNC: 8.2 G/DL (ref 6–8.4)
PROT UR QL STRIP: NEGATIVE
RBC # BLD AUTO: 4.95 M/UL (ref 4–5.4)
SODIUM SERPL-SCNC: 140 MMOL/L (ref 136–145)
SP GR UR STRIP: 1.01 (ref 1–1.03)
TRIGL SERPL-MCNC: 111 MG/DL (ref 30–150)
TSH SERPL DL<=0.005 MIU/L-ACNC: 2.13 UIU/ML (ref 0.4–4)
URN SPEC COLLECT METH UR: ABNORMAL
UROBILINOGEN UR STRIP-ACNC: NEGATIVE EU/DL
WBC # BLD AUTO: 9.2 K/UL (ref 3.9–12.7)
WBC #/AREA URNS HPF: 1 /HPF (ref 0–5)
YEAST URNS QL MICRO: ABNORMAL

## 2025-01-10 PROCEDURE — 36415 COLL VENOUS BLD VENIPUNCTURE: CPT | Performed by: INTERNAL MEDICINE

## 2025-01-10 PROCEDURE — 84443 ASSAY THYROID STIM HORMONE: CPT | Performed by: INTERNAL MEDICINE

## 2025-01-10 PROCEDURE — 80053 COMPREHEN METABOLIC PANEL: CPT | Performed by: INTERNAL MEDICINE

## 2025-01-10 PROCEDURE — 80345 DRUG SCREENING BARBITURATES: CPT | Performed by: INTERNAL MEDICINE

## 2025-01-10 PROCEDURE — 80061 LIPID PANEL: CPT | Performed by: INTERNAL MEDICINE

## 2025-01-10 PROCEDURE — 81000 URINALYSIS NONAUTO W/SCOPE: CPT | Performed by: INTERNAL MEDICINE

## 2025-01-10 PROCEDURE — 83036 HEMOGLOBIN GLYCOSYLATED A1C: CPT | Performed by: INTERNAL MEDICINE

## 2025-01-10 PROCEDURE — 85025 COMPLETE CBC W/AUTO DIFF WBC: CPT | Performed by: INTERNAL MEDICINE

## 2025-01-15 LAB
1OH-MIDAZOLAM UR QL SCN: NOT DETECTED
6MAM UR QL: NOT DETECTED
7AMINOCLONAZEPAM UR QL: NOT DETECTED
A-OH ALPRAZ UR QL: NOT DETECTED
ALPRAZ UR QL: NOT DETECTED
AMPHET UR QL SCN: NOT DETECTED
ANNOTATION COMMENT IMP: NORMAL
BARBITURATES UR QL: NEGATIVE
BUPRENORPHINE UR QL: NOT DETECTED
BZE UR QL: NEGATIVE
CARBOXYTHC UR QL: NEGATIVE
CARISOPRODOL UR QL: NEGATIVE
CLONAZEPAM UR QL: NOT DETECTED
CODEINE UR QL: NOT DETECTED
CREAT UR-MCNC: 147.1 MG/DL (ref 20–400)
DIAZEPAM UR QL: NOT DETECTED
ETHYL GLUCURONIDE UR QL: NEGATIVE
FENTANYL UR QL: NOT DETECTED
GABAPENTIN UR QL CFM: NOT DETECTED
HYDROCODONE UR QL: PRESENT
HYDROMORPHONE UR QL: PRESENT
LORAZEPAM UR QL: NOT DETECTED
MDA UR QL: NOT DETECTED
MDEA UR QL: NOT DETECTED
MDMA UR QL: NOT DETECTED
ME-PHENIDATE UR QL: NOT DETECTED
METHADONE UR QL: NEGATIVE
METHAMPHET UR QL: NOT DETECTED
MIDAZOLAM UR QL SCN: NOT DETECTED
MORPHINE UR QL: NOT DETECTED
NALOXONE UR QL CFM: NOT DETECTED
NORBUPRENORPHINE UR QL CFM: NOT DETECTED
NORDIAZEPAM UR QL: NOT DETECTED
NORFENTANYL UR QL: NOT DETECTED
NORHYDROCODONE UR QL CFM: PRESENT
NORMEPERIDINE UR QL CFM: NOT DETECTED
NOROXYCODONE UR QL CFM: NOT DETECTED
NOROXYMORPHONE UR QL SCN: NOT DETECTED
OXAZEPAM UR QL: NOT DETECTED
OXYCODONE UR QL: NOT DETECTED
OXYMORPHONE UR QL: NOT DETECTED
PATHOLOGY STUDY: NORMAL
PCP UR QL: NEGATIVE
PHENTERMINE UR QL: NOT DETECTED
PREGABALIN UR QL CFM: NOT DETECTED
SERVICE CMNT-IMP: NORMAL
TAPENTADOL UR QL SCN: NOT DETECTED
TAPENTADOL UR QL SCN: NOT DETECTED
TEMAZEPAM UR QL: NOT DETECTED
TRAMADOL UR QL: NEGATIVE
ZOLPIDEM PHENYL-4-CARB UR QL SCN: NOT DETECTED
ZOLPIDEM UR QL: NOT DETECTED

## 2025-05-08 ENCOUNTER — PATIENT MESSAGE (OUTPATIENT)
Dept: ORTHOPEDICS | Facility: CLINIC | Age: 50
End: 2025-05-08
Payer: MEDICAID

## 2025-05-23 ENCOUNTER — OFFICE VISIT (OUTPATIENT)
Dept: ORTHOPEDICS | Facility: CLINIC | Age: 50
End: 2025-05-23
Payer: MEDICAID

## 2025-05-23 VITALS
DIASTOLIC BLOOD PRESSURE: 80 MMHG | WEIGHT: 293 LBS | HEART RATE: 99 BPM | OXYGEN SATURATION: 95 % | RESPIRATION RATE: 18 BRPM | BODY MASS INDEX: 47.09 KG/M2 | HEIGHT: 66 IN | SYSTOLIC BLOOD PRESSURE: 126 MMHG

## 2025-05-23 DIAGNOSIS — M25.572 CHRONIC PAIN OF LEFT ANKLE: Primary | ICD-10-CM

## 2025-05-23 DIAGNOSIS — G89.29 CHRONIC PAIN OF LEFT ANKLE: Primary | ICD-10-CM

## 2025-05-23 PROCEDURE — 99214 OFFICE O/P EST MOD 30 MIN: CPT | Mod: PBBFAC | Performed by: PHYSICIAN ASSISTANT

## 2025-05-23 PROCEDURE — 99999 PR PBB SHADOW E&M-EST. PATIENT-LVL IV: CPT | Mod: PBBFAC,,, | Performed by: PHYSICIAN ASSISTANT

## 2025-05-23 NOTE — PROGRESS NOTES
Subjective:      Patient ID: Marci Hollingsworth is a 49 y.o. female.    Chief Complaint: Pain of the Left Ankle    Review of patient's allergies indicates:   Allergen Reactions    Lisinopril Swelling    Morphine Hives    Shellfish containing products Swelling    Citrus flavor Hives    Sulindac Itching    Penicillin Rash      Pt returns to clinic for follow up of left ankle pain.  She reports that pain has continued since she was last seen almost 6 months ago.  She was not able to complete PT, however, she has been doing home exercise program.  Has been wearing CAM boot as needed which does help some.    12/4/24:  48 yo F presents to clinic with c/o left foot and ankle pain x few months.  No injury or trauma.  Achy pain to lateral ankle as well as sharp pain to plantar heel.  Worse with walking and improves some with rest.  Occasional swelling to foot but no redness or warmth.  No numbness/tingling.  She has not tried any treatment for this yet, but says that she does have prescription naproxen at home.        Review of Systems   Constitutional: Negative for chills, diaphoresis and fever.   HENT:  Negative for congestion, ear discharge and ear pain.    Eyes:  Negative for blurred vision, discharge, double vision and pain.   Cardiovascular:  Negative for chest pain, claudication and cyanosis.   Respiratory:  Negative for cough, hemoptysis and shortness of breath.    Endocrine: Negative for cold intolerance and heat intolerance.   Skin:  Negative for color change, dry skin, itching and rash.   Musculoskeletal:  Positive for joint pain and joint swelling. Negative for arthritis, back pain, falls, gout, muscle weakness and neck pain.   Gastrointestinal:  Negative for abdominal pain and change in bowel habit.   Neurological:  Negative for brief paralysis, disturbances in coordination, dizziness, numbness and paresthesias.   Psychiatric/Behavioral:  Negative for altered mental status and depression.          Objective:           General    Constitutional: She is oriented to person, place, and time. She appears well-developed and well-nourished. No distress.   HENT:   Head: Atraumatic.   Eyes: EOM are normal. Right eye exhibits no discharge. Left eye exhibits no discharge.   Cardiovascular:  Normal rate.            Pulmonary/Chest: Effort normal. No respiratory distress.   Abdominal: Soft.   Neurological: She is alert and oriented to person, place, and time.   Psychiatric: She has a normal mood and affect. Her behavior is normal.         Right Ankle/Foot Exam     Inspection   Deformity: absent  Erythema: absent  Bruising: Ankle - absent Foot - absent    Range of Motion   The patient has normal right ankle ROM.  Whyte Test:  negative  First MTP Joint: normal    Muscle Strength   The patient has normal right ankle strength.    Other   Sensation: normal    Left Ankle/Foot Exam     Inspection  Deformity: absent  Erythema: absent  Bruising: Ankle - absent Foot - absent    Tenderness   The patient is tender to palpation of the lateral malleolus.    Range of Motion   The patient has normal left ankle ROM.   Whyte Test:  normal  First MTP Joint: normal    Muscle Strength   The patient has normal left ankle strength.    Other   Sensation: normal    Comments:  Tenderness to plantar heel      Vascular Exam       Edema  Right Lower Leg: absent  Left Lower Leg: absent              Assessment:         Xray Left Ankle 6/6/24:  The alignment is within normal limits. No displaced fractures identified. No evidence of lytic or blastic lesions.Joint spaces are unremarkable.Moderate-sized plantar and posterior calcaneal spurs. Dorsal spurring at the midfoot.Soft tissue swelling laterally.     Encounter Diagnosis   Name Primary?    Chronic pain of left ankle Yes    Chronic pain of left ankle  -     MRI Ankle Without Contrast Left; Future; Expected date: 05/23/2025               Plan:         We discussed diagnosis and treatment options. Pt would  like to proceed with MRI at this time. We also discussed referral to podiatry pending MRI results.      MRI left ankle.  Naproxen as prescribed by PCP as needed.  Ice compress to the affected area 2-3x a day for 15-20 minutes as needed for pain management.  PT referral.  Foot and ankle conditioning home exercise program. [employer] handout of exercises provided to patient.  RTC after MRI for results and follow up, sooner if needed.    Patient voices understanding of and agreement with treatment plan. All of the patient's questions were answered, and the patient will contact us if she has any questions or concerns in the interim.

## 2025-06-07 ENCOUNTER — HOSPITAL ENCOUNTER (OUTPATIENT)
Dept: RADIOLOGY | Facility: HOSPITAL | Age: 50
Discharge: HOME | End: 2025-06-07
Attending: PHYSICIAN ASSISTANT
Payer: MEDICAID

## 2025-06-07 DIAGNOSIS — G89.29 CHRONIC PAIN OF LEFT ANKLE: ICD-10-CM

## 2025-06-07 DIAGNOSIS — M25.572 CHRONIC PAIN OF LEFT ANKLE: ICD-10-CM

## 2025-06-07 PROCEDURE — 73721 MRI JNT OF LWR EXTRE W/O DYE: CPT | Mod: 26,LT,, | Performed by: RADIOLOGY

## 2025-06-07 PROCEDURE — 73721 MRI JNT OF LWR EXTRE W/O DYE: CPT | Mod: TC,LT

## 2025-06-10 ENCOUNTER — RESULTS FOLLOW-UP (OUTPATIENT)
Dept: ORTHOPEDICS | Facility: CLINIC | Age: 50
End: 2025-06-10

## 2025-06-11 ENCOUNTER — OFFICE VISIT (OUTPATIENT)
Dept: ORTHOPEDICS | Facility: CLINIC | Age: 50
End: 2025-06-11
Payer: MEDICAID

## 2025-06-11 DIAGNOSIS — M25.572 CHRONIC PAIN OF LEFT ANKLE: Primary | ICD-10-CM

## 2025-06-11 DIAGNOSIS — M79.672 PAIN OF LEFT HEEL: ICD-10-CM

## 2025-06-11 DIAGNOSIS — G89.29 CHRONIC PAIN OF LEFT ANKLE: Primary | ICD-10-CM

## 2025-06-11 PROCEDURE — 1159F MED LIST DOCD IN RCRD: CPT | Mod: CPTII,95,, | Performed by: PHYSICIAN ASSISTANT

## 2025-06-11 PROCEDURE — 98004 SYNCH AUDIO-VIDEO EST SF 10: CPT | Mod: 95,,, | Performed by: PHYSICIAN ASSISTANT

## 2025-06-11 PROCEDURE — 1160F RVW MEDS BY RX/DR IN RCRD: CPT | Mod: CPTII,95,, | Performed by: PHYSICIAN ASSISTANT

## 2025-06-11 PROCEDURE — 3044F HG A1C LEVEL LT 7.0%: CPT | Mod: CPTII,95,, | Performed by: PHYSICIAN ASSISTANT

## 2025-06-11 RX ORDER — MELOXICAM 15 MG/1
15 TABLET ORAL DAILY
Qty: 30 TABLET | Refills: 0 | Status: SHIPPED | OUTPATIENT
Start: 2025-06-11

## 2025-06-11 NOTE — PROGRESS NOTES
The patient location is: [place]  The chief complaint leading to consultation is: left ankle pain    Visit type: audiovisual    Face to Face time with patient: 5  10 minutes of total time spent on the encounter, which includes face to face time and non-face to face time preparing to see the patient (eg, review of tests), Obtaining and/or reviewing separately obtained history, Documenting clinical information in the electronic or other health record, Independently interpreting results (not separately reported) and communicating results to the patient/family/caregiver, or Care coordination (not separately reported).         Each patient to whom he or she provides medical services by telemedicine is:  (1) informed of the relationship between the physician and patient and the respective role of any other health care provider with respect to management of the patient; and (2) notified that he or she may decline to receive medical services by telemedicine and may withdraw from such care at any time.    Notes:     Today's visit is a follow up on left ankle pain.  We reviewed recent MRI results.  Pt has failed conservative treatment so will refer to podiatry.  She is also asking for a new prescription for antiinflammatory.

## 2025-07-22 ENCOUNTER — LAB VISIT (OUTPATIENT)
Dept: LAB | Facility: HOSPITAL | Age: 50
End: 2025-07-22
Attending: INTERNAL MEDICINE
Payer: MEDICAID

## 2025-07-22 DIAGNOSIS — T46.4X5A ANGIOEDEMA DUE TO ANGIOTENSIN CONVERTING ENZYME INHIBITOR (ACE-I): ICD-10-CM

## 2025-07-22 DIAGNOSIS — E11.65 CONTROLLED TYPE 2 DIABETES MELLITUS WITH HYPERGLYCEMIA, WITHOUT LONG-TERM CURRENT USE OF INSULIN: ICD-10-CM

## 2025-07-22 DIAGNOSIS — M17.0 BILATERAL PRIMARY OSTEOARTHRITIS OF KNEE: ICD-10-CM

## 2025-07-22 DIAGNOSIS — T78.3XXA ANGIOEDEMA DUE TO ANGIOTENSIN CONVERTING ENZYME INHIBITOR (ACE-I): ICD-10-CM

## 2025-07-22 DIAGNOSIS — E03.4 HYPOTHYROIDISM DUE TO ACQUIRED ATROPHY OF THYROID: ICD-10-CM

## 2025-07-22 DIAGNOSIS — E53.8 B12 DEFICIENCY: ICD-10-CM

## 2025-07-22 DIAGNOSIS — I10 ESSENTIAL HYPERTENSION: ICD-10-CM

## 2025-07-22 DIAGNOSIS — E78.2 MIXED HYPERLIPIDEMIA: ICD-10-CM

## 2025-07-22 DIAGNOSIS — D89.2 PARAPROTEINEMIA: ICD-10-CM

## 2025-07-22 DIAGNOSIS — E66.01 MORBID OBESITY WITH BMI OF 50.0-59.9, ADULT: ICD-10-CM

## 2025-07-22 LAB
ABSOLUTE EOSINOPHIL (OHS): 0.45 K/UL
ABSOLUTE MONOCYTE (OHS): 0.62 K/UL (ref 0.3–1)
ABSOLUTE NEUTROPHIL COUNT (OHS): 3.98 K/UL (ref 1.8–7.7)
ALBUMIN SERPL BCP-MCNC: 3.2 G/DL (ref 3.5–5.2)
ALBUMIN/CREAT UR: 8.8 UG/MG
ALP SERPL-CCNC: 73 UNIT/L (ref 40–150)
ALT SERPL W/O P-5'-P-CCNC: 12 UNIT/L (ref 10–44)
ANION GAP (OHS): 8 MMOL/L (ref 8–16)
AST SERPL-CCNC: 12 UNIT/L (ref 11–45)
BASOPHILS # BLD AUTO: 0.07 K/UL
BASOPHILS NFR BLD AUTO: 0.9 %
BILIRUB SERPL-MCNC: 0.5 MG/DL (ref 0.1–1)
BILIRUB UR QL STRIP.AUTO: NEGATIVE
BUN SERPL-MCNC: 12 MG/DL (ref 6–20)
CALCIUM SERPL-MCNC: 9.2 MG/DL (ref 8.7–10.5)
CHLORIDE SERPL-SCNC: 105 MMOL/L (ref 95–110)
CHOLEST SERPL-MCNC: 154 MG/DL (ref 120–199)
CHOLEST/HDLC SERPL: 3.8 {RATIO} (ref 2–5)
CLARITY UR: CLEAR
CO2 SERPL-SCNC: 27 MMOL/L (ref 23–29)
COLOR UR AUTO: YELLOW
CREAT SERPL-MCNC: 0.6 MG/DL (ref 0.5–1.4)
CREAT UR-MCNC: 181.2 MG/DL (ref 15–325)
EAG (OHS): 108 MG/DL (ref 68–131)
ERYTHROCYTE [DISTWIDTH] IN BLOOD BY AUTOMATED COUNT: 13.2 % (ref 11.5–14.5)
GFR SERPLBLD CREATININE-BSD FMLA CKD-EPI: >60 ML/MIN/1.73/M2
GLUCOSE SERPL-MCNC: 105 MG/DL (ref 70–110)
GLUCOSE UR QL STRIP: ABNORMAL
HBA1C MFR BLD: 5.4 % (ref 4–5.6)
HCT VFR BLD AUTO: 41 % (ref 37–48.5)
HDLC SERPL-MCNC: 41 MG/DL (ref 40–75)
HDLC SERPL: 26.6 % (ref 20–50)
HGB BLD-MCNC: 14.3 GM/DL (ref 12–16)
HGB UR QL STRIP: NEGATIVE
IMM GRANULOCYTES # BLD AUTO: 0.01 K/UL (ref 0–0.04)
IMM GRANULOCYTES NFR BLD AUTO: 0.1 % (ref 0–0.5)
KETONES UR QL STRIP: NEGATIVE
LDLC SERPL CALC-MCNC: 89.8 MG/DL (ref 63–159)
LEUKOCYTE ESTERASE UR QL STRIP: NEGATIVE
LYMPHOCYTES # BLD AUTO: 2.98 K/UL (ref 1–4.8)
MAGNESIUM SERPL-MCNC: 1.8 MG/DL (ref 1.6–2.6)
MCH RBC QN AUTO: 31.4 PG (ref 27–31)
MCHC RBC AUTO-ENTMCNC: 34.9 G/DL (ref 32–36)
MCV RBC AUTO: 90 FL (ref 82–98)
MICROALBUMIN UR-MCNC: 16 UG/ML (ref ?–5000)
NITRITE UR QL STRIP: NEGATIVE
NONHDLC SERPL-MCNC: 113 MG/DL
NUCLEATED RBC (/100WBC) (OHS): 0 /100 WBC
PH UR STRIP: 6 [PH]
PHOSPHATE SERPL-MCNC: 2.8 MG/DL (ref 2.7–4.5)
PLATELET # BLD AUTO: 333 K/UL (ref 150–450)
PMV BLD AUTO: 8.8 FL (ref 9.2–12.9)
POTASSIUM SERPL-SCNC: 3.3 MMOL/L (ref 3.5–5.1)
PROT SERPL-MCNC: 7.9 GM/DL (ref 6–8.4)
PROT UR QL STRIP: NEGATIVE
RBC # BLD AUTO: 4.56 M/UL (ref 4–5.4)
RELATIVE EOSINOPHIL (OHS): 5.5 %
RELATIVE LYMPHOCYTE (OHS): 36.7 % (ref 18–48)
RELATIVE MONOCYTE (OHS): 7.6 % (ref 4–15)
RELATIVE NEUTROPHIL (OHS): 49.2 % (ref 38–73)
SODIUM SERPL-SCNC: 140 MMOL/L (ref 136–145)
SP GR UR STRIP: 1.02
T4 FREE SERPL-MCNC: 0.89 NG/DL (ref 0.71–1.51)
TRIGL SERPL-MCNC: 116 MG/DL (ref 30–150)
TSH SERPL-ACNC: 2.02 UIU/ML (ref 0.4–4)
URATE SERPL-MCNC: 4.9 MG/DL (ref 2.4–5.7)
UROBILINOGEN UR STRIP-ACNC: NEGATIVE EU/DL
VIT B12 SERPL-MCNC: 436 PG/ML (ref 210–950)
WBC # BLD AUTO: 8.11 K/UL (ref 3.9–12.7)

## 2025-07-22 PROCEDURE — 82465 ASSAY BLD/SERUM CHOLESTEROL: CPT

## 2025-07-22 PROCEDURE — 85025 COMPLETE CBC W/AUTO DIFF WBC: CPT

## 2025-07-22 PROCEDURE — 83735 ASSAY OF MAGNESIUM: CPT

## 2025-07-22 PROCEDURE — 84165 PROTEIN E-PHORESIS SERUM: CPT

## 2025-07-22 PROCEDURE — 84100 ASSAY OF PHOSPHORUS: CPT

## 2025-07-22 PROCEDURE — 36415 COLL VENOUS BLD VENIPUNCTURE: CPT

## 2025-07-22 PROCEDURE — 84550 ASSAY OF BLOOD/URIC ACID: CPT

## 2025-07-22 PROCEDURE — 80364 OPIOID &OPIATE ANALOG 5/MORE: CPT

## 2025-07-22 PROCEDURE — 86335 IMMUNFIX E-PHORSIS/URINE/CSF: CPT

## 2025-07-22 PROCEDURE — 82570 ASSAY OF URINE CREATININE: CPT

## 2025-07-22 PROCEDURE — 82607 VITAMIN B-12: CPT

## 2025-07-22 PROCEDURE — 81003 URINALYSIS AUTO W/O SCOPE: CPT

## 2025-07-22 PROCEDURE — 83521 IG LIGHT CHAINS FREE EACH: CPT

## 2025-07-22 PROCEDURE — 84165 PROTEIN E-PHORESIS SERUM: CPT | Mod: ,,, | Performed by: PATHOLOGY

## 2025-07-22 PROCEDURE — 83036 HEMOGLOBIN GLYCOSYLATED A1C: CPT

## 2025-07-22 PROCEDURE — 84166 PROTEIN E-PHORESIS/URINE/CSF: CPT

## 2025-07-22 PROCEDURE — 82310 ASSAY OF CALCIUM: CPT

## 2025-07-22 PROCEDURE — 86334 IMMUNOFIX E-PHORESIS SERUM: CPT

## 2025-07-22 PROCEDURE — 84439 ASSAY OF FREE THYROXINE: CPT

## 2025-07-23 LAB
ALBUMIN, SPE (OHS): 3.44 G/DL (ref 3.35–5.55)
ALPHA 1 GLOB (OHS): 0.33 GM/DL (ref 0.17–0.41)
ALPHA 2 GLOB (OHS): 0.78 GM/DL (ref 0.43–0.99)
BETA GLOB (OHS): 1.06 GM/DL (ref 0.5–1.1)
GAMMA GLOBULIN (OHS): 1.38 GM/DL (ref 0.67–1.58)
KAPPA LC FREE SER-MCNC: 1.84 MG/L (ref 0.26–1.65)
KAPPA LC FREE/LAMBDA FREE SER: 4.15 MG/DL (ref 0.33–1.94)
LAMBDA LC FREE SERPL-MCNC: 2.26 MG/DL (ref 0.57–2.63)
PROT SERPL-MCNC: 7 GM/DL (ref 6–8.4)

## 2025-07-24 LAB
PATHOLOGIST REVIEW - SPE (OHS): NORMAL
PROT UR-MCNC: 17 MG/DL

## 2025-07-25 LAB — PATHOLOGIST INTERPRETATION - UPE (OHS): NORMAL

## 2025-07-26 LAB — PATHOLOGIST INTERPRETATION - IFE URINE (OHS): NORMAL

## 2025-07-27 LAB
1OH-MIDAZOLAM UR QL SCN: NOT DETECTED
6MAM UR QL: NOT DETECTED
7AMINOCLONAZEPAM UR QL: NOT DETECTED
A-OH ALPRAZ UR QL: NOT DETECTED
ALPRAZ UR QL: NOT DETECTED
AMPHET UR QL SCN: NOT DETECTED
ANNOTATION COMMENT IMP: ABNORMAL
AR NOROXYMORPHONE (CUTOFF 100 NG/ML): NOT DETECTED
AR OXYMORPHONE (CUTOFF 40 NG/ML): NOT DETECTED
BARBITURATES UR QL: NEGATIVE
BUPRENORPHINE UR QL: NOT DETECTED
BZE UR QL: NEGATIVE
CARBOXYTHC UR QL: NEGATIVE
CARISOPRODOL UR QL: NEGATIVE
CLONAZEPAM UR QL: NOT DETECTED
CODEINE UR QL: NOT DETECTED
CREAT UR-MCNC: 179.5 MG/DL
DIAZEPAM UR QL: NOT DETECTED
ETHYL GLUCURONIDE UR QL: NEGATIVE
FENTANYL UR QL: NOT DETECTED
GABAPENTIN UR QL CFM: NOT DETECTED
HYDROCODONE UR QL: PRESENT
HYDROMORPHONE UR QL: PRESENT
LORAZEPAM UR QL: NOT DETECTED
MDA UR QL: NOT DETECTED
MDEA UR QL: NOT DETECTED
MDMA UR QL: NOT DETECTED
ME-PHENIDATE UR QL: NOT DETECTED
METHADONE UR QL: NEGATIVE
METHAMPHET UR QL: NOT DETECTED
MIDAZOLAM UR QL SCN: NOT DETECTED
MORPHINE UR QL: NOT DETECTED
NALOXONE UR QL CFM: NOT DETECTED
NORBUPRENORPHINE UR QL CFM: NOT DETECTED
NORDIAZEPAM UR QL: NOT DETECTED
NORFENTANYL UR QL: NOT DETECTED
NORMEPERIDINE UR QL CFM: NOT DETECTED
NOROXYCODONE UR QL CFM: PRESENT
NOROXYMORPHONE UR QL SCN: NOT DETECTED
OXAZEPAM UR QL: NOT DETECTED
OXYCODONE UR QL: NOT DETECTED
PATHOLOGIST INTERPRETATION - IFE SERUM (OHS): NORMAL
PATHOLOGY STUDY: ABNORMAL
PCP UR QL: NEGATIVE
PHENTERMINE UR QL: NOT DETECTED
PREGABALIN UR QL CFM: NOT DETECTED
SERVICE CMNT-IMP: ABNORMAL
TAPENTADOL UR QL SCN: NOT DETECTED
TAPENTADOL UR QL SCN: NOT DETECTED
TEMAZEPAM UR QL: NOT DETECTED
TRAMADOL UR QL: NEGATIVE
ZOLPIDEM PHENYL-4-CARB UR QL SCN: NOT DETECTED
ZOLPIDEM UR QL: NOT DETECTED

## 2025-07-29 PROBLEM — D89.2 PARAPROTEINEMIA: Status: ACTIVE | Noted: 2025-07-29

## 2025-07-29 PROBLEM — M25.562 CHRONIC PAIN OF BOTH KNEES: Status: ACTIVE | Noted: 2025-07-29

## 2025-07-29 PROBLEM — D89.0 POLYCLONAL GAMMOPATHY: Status: ACTIVE | Noted: 2025-07-29

## 2025-07-29 PROBLEM — F51.01 PRIMARY INSOMNIA: Status: ACTIVE | Noted: 2025-07-29

## 2025-07-29 PROBLEM — G89.29 CHRONIC PAIN OF BOTH KNEES: Status: ACTIVE | Noted: 2025-07-29

## 2025-07-29 PROBLEM — M25.561 CHRONIC PAIN OF BOTH KNEES: Status: ACTIVE | Noted: 2025-07-29

## 2025-08-04 PROBLEM — R10.9 ABDOMINAL CRAMPING: Status: RESOLVED | Noted: 2024-08-22 | Resolved: 2025-08-04

## 2025-08-07 DIAGNOSIS — M79.672 PAIN IN LEFT FOOT: Primary | ICD-10-CM

## 2025-08-07 DIAGNOSIS — M25.572 LEFT ANKLE PAIN, UNSPECIFIED CHRONICITY: Primary | ICD-10-CM

## 2025-08-11 ENCOUNTER — HOSPITAL ENCOUNTER (OUTPATIENT)
Dept: RADIOLOGY | Facility: HOSPITAL | Age: 50
Discharge: HOME | End: 2025-08-11
Attending: PODIATRIST
Payer: MEDICAID

## 2025-08-11 DIAGNOSIS — M25.572 LEFT ANKLE PAIN, UNSPECIFIED CHRONICITY: ICD-10-CM

## 2025-08-11 PROCEDURE — 73610 X-RAY EXAM OF ANKLE: CPT | Mod: 26,LT,, | Performed by: RADIOLOGY

## 2025-08-11 PROCEDURE — 73610 X-RAY EXAM OF ANKLE: CPT | Mod: TC,LT
